# Patient Record
Sex: FEMALE | Race: WHITE | NOT HISPANIC OR LATINO | Employment: FULL TIME | URBAN - METROPOLITAN AREA
[De-identification: names, ages, dates, MRNs, and addresses within clinical notes are randomized per-mention and may not be internally consistent; named-entity substitution may affect disease eponyms.]

---

## 2017-01-17 ENCOUNTER — ALLSCRIPTS OFFICE VISIT (OUTPATIENT)
Dept: OTHER | Facility: OTHER | Age: 47
End: 2017-01-17

## 2017-03-13 ENCOUNTER — GENERIC CONVERSION - ENCOUNTER (OUTPATIENT)
Dept: OTHER | Facility: OTHER | Age: 47
End: 2017-03-13

## 2018-01-11 NOTE — MISCELLANEOUS
Message  Return to work or school:   Robinson Lewis is under my professional care  She was seen in my office on 11/10/2016   She is able to return to work on  12/09/2016    Please allow her to be off from work December 6-8 due to surgery  SHe may return on 12/9/2016 without restrictions per Dr Oakes [facility]  Dr Komal Rodriguez   Electronically signed by :  David Kelsey, ; Dec  7 2016 12:47PM EST                       (Author)

## 2018-01-12 NOTE — MISCELLANEOUS
Provider Comments  Provider Comments:   PT NO SHOW 03/13/2017        Signatures   Electronically signed by :  [address] [place] [address], ; Mar 13 2017  4:57PM EST                       (Author)

## 2018-01-13 VITALS
DIASTOLIC BLOOD PRESSURE: 70 MMHG | WEIGHT: 177.38 LBS | SYSTOLIC BLOOD PRESSURE: 120 MMHG | HEIGHT: 63 IN | BODY MASS INDEX: 31.43 KG/M2

## 2018-01-13 NOTE — MISCELLANEOUS
Provider Comments  Provider Comments:   PATIENT NO SHOWED FOR APPOINTMENT TODAY,CALLED AND LEFT MESSAGE ON PATIENTS VOICE MAIL TO CALL THE OFFICE      Signatures   Electronically signed by : Jayden Duffy, ; Nov 7 2016  9:55AM EST                       (Author)

## 2018-01-13 NOTE — MISCELLANEOUS
Message  Return to work or school:   Devan Luis is under my professional care  She was seen in my office on 09/30/2016       PLEASE ALLOW DAYANA TO USE RESTROOM AS NEEDED DUE TO HEAVY BLEEDING  Dr C W Riedel/placido        Signatures   Electronically signed by : Shannan Crook, ; Sep 30 2016 12:53PM EST                       (Author)

## 2018-01-16 NOTE — MISCELLANEOUS
Message  DAVID VERONICA FROM [facility] CALLED OFFICE STATING SHE HAS BEEN TRYING TO CALL PATIENT SINCE 10/10/2016 TO GET HER IN FOR PRE ADMISSION TESTING, PATIENT NEVER CALLED BACK, I TRIED CALLING PATIENT LEFT MESSAGE ON PATIENTS PHONE TO CALL OFFICE, PATIENT IS SCHEDULED FOR SURGERY ON 10/21/16      Active Problems    1  Abnormal uterine bleeding (AUB) (626 9) (N93 9)   2  Asthma (493 90) (J45 909)   3  Encounter for cervical Pap smear with pelvic exam (V76 2,V72 31) (Z01 419)   4  Encounter for mammogram to establish baseline mammogram (V76 12) (Z12 31)   5  Metrorrhagia (626 6) (N92 1)   6  Pap smear abnormality of cervix with LGSIL (795 03) (R87 612)   7  Pelvic pain in female (625 9) (R10 2)    Current Meds   1  Ibuprofen TABS; Therapy: (Recorded:67Ody1715) to Recorded   2  Proventil  (90 Base) MCG/ACT Inhalation Aerosol Solution; 2 puffs 4 times per   day as needed; Therapy: 96Osk7713 to (Last Rx:06Tho1875)  Requested for: 34Ogv0667 Ordered    Allergies    1   Codeine Derivatives    Signatures   Electronically signed by : Gabriella Tellez, ; Oct 20 2016 10:41AM EST                       (Author)

## 2018-01-16 NOTE — MISCELLANEOUS
Message  PATIENT HAD SURGERY ON 12/06/16, PATIENT CALLED STATING SHE WORK UP THIS MORNING AND COUGH, PATIENT STATED SHE BEGAN TO BLEED HEAVY WITH BLOOD CLOTS, PATIENT STATED SHE WENT THROUGH 8-9 PADS SINCE 11:00 AM THIS MORNING, PHONE CALL WAS TRANSFERRED TO DR RIEDEL      Active Problems    1  Abnormal uterine bleeding (AUB) (626 9) (N93 9)   2  Asthma (493 90) (J45 909)   3  Encounter for cervical Pap smear with pelvic exam (V76 2,V72 31) (Z01 419)   4  Encounter for mammogram to establish baseline mammogram (V76 12) (Z12 31)   5  Metrorrhagia (626 6) (N92 1)   6  Pap smear abnormality of cervix with LGSIL (795 03) (R87 612)   7  Pelvic pain in female (625 9) (R10 2)    Current Meds   1  Ibuprofen TABS; Therapy: (Recorded:21Iwb2058) to Recorded   2  Proventil  (90 Base) MCG/ACT Inhalation Aerosol Solution; 2 puffs 4 times per   day as needed; Therapy: 90Gbk0428 to (Last Rx:17Mmb8038)  Requested for: 47Rdu9162 Ordered    Allergies    1   Codeine Derivatives    Signatures   Electronically signed by : Adele Shen, ; Dec 15 2016  3:54PM EST                       (Author)

## 2018-01-24 NOTE — PROCEDURES
Active Problems    1  Abnormal uterine bleeding (AUB) (626 9) (N93 9)   2  Asthma (493 90) (J45 909)   3  Encounter for cervical Pap smear with pelvic exam (V76 2,V72 31) (Z01 419)   4  Encounter for mammogram to establish baseline mammogram (V76 12) (Z12 31)   5  Metrorrhagia (626 6) (N92 1)   6  Pelvic pain in female (625 9) (R10 2)    Current Meds    1  Proventil  (90 Base) MCG/ACT Inhalation Aerosol Solution; 2 puffs 4 times per   day as needed; Therapy: 79Gll3489 to (Last Rx:11Aci2641)  Requested for: 72Eek3309 Ordered    2  Ibuprofen TABS; Therapy: (Recorded:70Zgt0117) to Recorded    Allergies    1  Codeine Derivatives    Procedure    Procedure: colposcopy  Indication: low grade squamous intraepithelial lesion  Risks, benefits and alternatives were discussed with the patient  We discussed possible complications, including infection, bleeding and allergic reaction  Written consent was obtained prior to the procedure  The patient was premedicated with ibuprofen  Procedure Note:   A cervical Pap smear was not performed  The squamocolumnar junction was not fully visualized  Observation without staining showed no abnormalities  After bathing the cervix in acetic acid, evaluation showed acetowhite changes at, but no punctation, no mosaicism and no atypical vessels  Cervical Biopsy: biopsies taken of the cervix  the biopsies were taken at 3-4-8-12 o'clock  Hemostasis was obtained with Monsel's solution  Patient Status: the patient tolerated the procedure well  Complications: there were no complications          Signatures   Electronically signed by : Cheryl Bethea DO; Sep 30 2016  9:05AM EST                       (Author)

## 2018-05-14 ENCOUNTER — OFFICE VISIT (OUTPATIENT)
Dept: INTERNAL MEDICINE CLINIC | Age: 48
End: 2018-05-14
Payer: COMMERCIAL

## 2018-05-14 VITALS
BODY MASS INDEX: 32.11 KG/M2 | SYSTOLIC BLOOD PRESSURE: 136 MMHG | DIASTOLIC BLOOD PRESSURE: 78 MMHG | TEMPERATURE: 98.4 F | WEIGHT: 181.2 LBS | HEIGHT: 63 IN | HEART RATE: 66 BPM | OXYGEN SATURATION: 96 %

## 2018-05-14 DIAGNOSIS — E66.9 OBESITY (BMI 30.0-34.9): ICD-10-CM

## 2018-05-14 DIAGNOSIS — M25.562 CHRONIC PAIN OF LEFT KNEE: Primary | ICD-10-CM

## 2018-05-14 DIAGNOSIS — J45.20 MILD INTERMITTENT ASTHMA WITHOUT COMPLICATION: ICD-10-CM

## 2018-05-14 DIAGNOSIS — Z72.0 TOBACCO USER: ICD-10-CM

## 2018-05-14 DIAGNOSIS — G89.29 CHRONIC PAIN OF LEFT KNEE: Primary | ICD-10-CM

## 2018-05-14 PROBLEM — M65.30 ACQUIRED TRIGGER FINGER: Status: ACTIVE | Noted: 2018-05-14

## 2018-05-14 PROCEDURE — 99203 OFFICE O/P NEW LOW 30 MIN: CPT | Performed by: INTERNAL MEDICINE

## 2018-05-14 PROCEDURE — 3008F BODY MASS INDEX DOCD: CPT | Performed by: INTERNAL MEDICINE

## 2018-05-14 RX ORDER — ALBUTEROL SULFATE 90 UG/1
2 AEROSOL, METERED RESPIRATORY (INHALATION) EVERY 6 HOURS PRN
Qty: 1 INHALER | Refills: 1 | Status: SHIPPED | OUTPATIENT
Start: 2018-05-14 | End: 2019-10-10

## 2018-05-14 RX ORDER — ALBUTEROL SULFATE 90 UG/1
2 AEROSOL, METERED RESPIRATORY (INHALATION) EVERY 6 HOURS PRN
Qty: 1 INHALER | Refills: 1 | Status: SHIPPED | OUTPATIENT
Start: 2018-05-14

## 2018-05-14 RX ORDER — ALBUTEROL SULFATE 90 UG/1
2 AEROSOL, METERED RESPIRATORY (INHALATION) 4 TIMES DAILY PRN
COMMUNITY
Start: 2013-09-03 | End: 2018-05-14 | Stop reason: SDUPTHER

## 2018-05-14 NOTE — PATIENT INSTRUCTIONS
Knee Exercises   AMBULATORY CARE:   What you need to know about knee exercises:  Knee exercises help strengthen the muscles around your knee  Strong muscles can help reduce pain and decrease your risk of future injury  Knee exercises also help you heal after an injury or surgery  · Start slow  These are beginning exercises  Ask your healthcare provider if you need to see a physical therapist for more advanced exercises  As you get stronger, you may be able to do more sets of each exercise or add weights  · Stop if you feel pain  It is normal to feel some discomfort at first  Regular exercise will help decrease your discomfort over time  · Do the exercises on both legs  Do this so both knees remain strong  · Warm up before you do knee exercises  Walk or ride a stationary bike for 5 or 10 minutes to warm your muscles  How to perform knee stretches safely:  Always stretch before you do strengthening exercises  Do these stretching exercises again after you do the strengthening exercises  Do these stretches 4 or 5 days a week, or as directed  · Standing calf stretch: Face a wall and place both palms flat on the wall, or hold the back of a chair for balance  Keep a slight bend in your knees  Take a big step backward with one leg  Keep your other leg directly under you  Keep both heels flat and press your hips forward  Hold the stretch for 30 seconds, and then relax for 30 seconds  Switch legs  Repeat 2 or 3 times on each leg  · Standing quadriceps stretch:  Stand and place one hand against a wall or hold the back of a chair for balance  With your weight on one leg, bend your other leg and grab your ankle  Bring your heel toward your buttocks  Hold the stretch for 30 to 60 seconds  Switch legs  Repeat 2 or 3 times on each leg  · Sitting hamstring stretch:  Sit with both legs straight in front of you  Do not point or flex your toes   Place your palms on the floor and slide your hands forward until you feel the stretch  Do not round your back  Hold the stretch for 30 seconds  Repeat 2 or 3 times  How to perform knee strengthening exercises safely:  Do these exercises 4 or 5 days a week, or as directed  · Standing half squats:  Stand with your feet shoulder-width apart  Lean your back against a wall or hold the back of a chair for balance, if needed  Slowly sit down about 10 inches, as if you are going to sit in a chair  Your body weight should be mostly over your heels  Hold the squat for 5 seconds, then rise to a standing position  Do 3 sets of 10 squats to strengthen your buttocks and thighs  · Standing hamstring curls: Face a wall and place both palms flat on the wall, or hold the back of a chair for balance  With your weight on one leg, lift your other foot as close to your buttocks as you can  Hold for 5 seconds and then lower your leg  Do 2 sets of 10 curls on each leg  This exercise strengthens the muscles in the back of your thigh  · Standing calf raises:  Face a wall and place both palms flat on the wall, or hold the back of a chair for balance  Stand up straight, and do not lean  Place all your weight on one leg by lifting the other foot off the floor  Raise the heel of the foot that is on the floor as high as you can and then lower it  Do 2 sets of 10 calf raises on each leg to strengthen your calf muscles  · Straight leg lifts:  Lie on your stomach with straight legs  Fold your arms in front of you and rest your head in your arms  Tighten your leg muscles and raise one leg as high as you can  Hold for 5 seconds, then lower your leg  Do 2 sets of 10 lifts on each leg to strengthen your buttocks  · Sitting leg lifts:  Sit in a chair  Slowly straighten and raise one leg  Squeeze your thigh muscles and hold for 5 seconds  Relax and return your foot to the floor  Do 2 sets of 10 lifts on each leg   This helps strengthen the muscles in the front of your thigh  Contact your healthcare provider if:   · You have new pain or your pain becomes worse  · You have questions or concerns about your condition or care  © 2017 [employer] Information is for End User's use only and may not be sold, redistributed or otherwise used for commercial purposes  All illustrations and images included in CareNotes® are the copyrighted property of A D [employer] M [employer]  or Zana Kelly  The above information is an  only  It is not intended as medical advice for individual conditions or treatments  Talk to your doctor, nurse or pharmacist before following any medical regimen to see if it is safe and effective for you  How to Stop Smoking   AMBULATORY CARE:   You will improve your health and the health of others around you  if you stop smoking  Your risk for heart and lung disease, cancer, stroke, heart attack, and vision problems will also decrease  You can benefit from quitting no matter how long you have smoked  Prepare to stop smoking:  Nicotine is a highly addictive drug found in cigarettes  Withdrawal symptoms can happen when you stop smoking and make it hard to quit  These include anxiety, depression, irritability, trouble sleeping, and increased appetite  You increase your chances of success if you prepare to quit  · Set a quit date  Veda Pry a date that is within the next 2 weeks  Do not pick a day that you think may be stressful or busy  Write down the day or [place] it on your calender  · Tell friends and family that you plan to quit  Explain that you may have withdrawal symptoms when you try to quit  Ask them to support you  They may be able to encourage you and help reduce your stress to make it easier for you to quit  · Make a list of your reasons for quitting  Put the list somewhere you will see it every day, such as your refrigerator  You can look at the list when you have a craving       · Remove all tobacco and nicotine products from your home, car, and workplace  Also, remove anything else that will tempt you to smoke, such as lighters, matches, or ashtrays  Clean your car, home, and places at work that smell like smoke  The smell of smoke can trigger a craving  · Identify triggers that make you want to smoke  This may include activities, feelings, or people  Also write down 1 way you can deal with each of your triggers  For example, if you want to smoke as soon as you wake up, plan another activity during this time, such as exercise  · Make a plan for how you will quit  Learn about the tools that can help you quit, such as medicine, counseling, or nicotine replacement therapy  Choose at least 2 options to help you quit  Tools to help you stop smoking:   · Counseling  from a trained healthcare provider can provide you with support and skills to quit smoking  The provider will also teach you to manage your withdrawal symptoms and cravings  You may receive counseling from one counselor, in group therapy, or through phone therapy called a quit line  · Nicotine replacement therapy (NRT)  such as nicotine patches, gum, or lozenges may help reduce your nicotine cravings  You may get these without a doctor's order  Do not use e-cigarettes or smokeless tobacco in place of cigarettes or to help you quit  They still contain nicotine  · Prescription medicines  such as nasal sprays or nicotine inhalers may help reduce your withdrawal symptoms  Other medicines may also be used to reduce your urge to smoke  Ask your healthcare provider about these medicines  You may need to start certain medicines 2 weeks before your quit date for them to work well  · Hypnosis  is a practice that helps guide you through thoughts and feelings  Hypnosis may help decrease your cravings and make you more willing to quit  · Acupuncture therapy  uses very thin needles to balance energy channels in the body   This is thought to help decrease cravings and symptoms of nicotine withdrawal      · Support groups  let you talk to others who are trying to quit or have already quit  It may be helpful to speak with others about how they quit  Manage your cravings:   · Avoid situations, people, and places that tempt you to smoke  Go to nonsmoking places, such as libraries or restaurants  Understand what tempts you and try to avoid these things  · Keep your hands busy  Hold things such as a stress ball or pen  · Put candy or toothpicks in your mouth  Keep lollipops, sugarless gum, or toothpicks with you at all times  · Do not have alcohol or caffeine  These drinks may tempt you to smoke  Drink healthy liquids such as water or juice instead  · Reward yourself when you resist your cravings  Rewards will motivate you and help you stay positive  · Do an activity that distracts you from your craving  Examples include going for a walk, exercising, or cleaning  Prevent weight gain after you quit:  You may gain a few pounds after you quit smoking  It is healthier for you to gain a few pounds than to continue to smoke  The following can help you prevent weight gain:  · Eat healthy foods  These include fruits, vegetables, whole-grain breads, low-fat dairy products, beans, lean meats, and fish  Eat healthy snacks, such as low-fat yogurt, if you get hungry between meals  · Drink water before, during, and between meals  This will make your stomach feel full and help prevent you from overeating  Ask your healthcare provider how much liquid to drink each day and which liquids are best for you  · Exercise  Take a walk or do some kind of exercise every day  Ask your healthcare provider what exercise is right for you  This may help reduce your cravings and reduce stress  For more support and information:   · Smokefree  gov  Phone: 6- 353 - 041-0509  Web Address: www smokefree  gov  © 2017 [employer] Information is for End User's use only and may not be sold, redistributed or otherwise used for commercial purposes  All illustrations and images included in CareNotes® are the copyrighted property of A [employer] Inc  or Zana Kelly  The above information is an  only  It is not intended as medical advice for individual conditions or treatments  Talk to your doctor, nurse or pharmacist before following any medical regimen to see if it is safe and effective for you

## 2018-05-14 NOTE — ASSESSMENT & PLAN NOTE
Patient has gained approximately 27 pounds in the last few months after by in a car    She does not walk anywhere anymore

## 2018-05-14 NOTE — ASSESSMENT & PLAN NOTE
Over the last several months patient's up increasing left knee pain with occasional locking  There has been no trauma    Will try OTC Motrin, ice and knee brace

## 2018-05-14 NOTE — PROGRESS NOTES
Assessment/Plan:    Chronic pain of left knee  Over the last several months patient's up increasing left knee pain with occasional locking  There has been no trauma  Will try OTC Motrin, ice and knee brace    Asthma  She has mild intermittent asthma for which she uses rare inhaler  Hers is outdated    Tobacco user  Patient currently smokes and does not want any medicine at present to quit    Obesity (BMI 30 0-34  9)  Patient has gained approximately 27 pounds in the last few months after by in a car  She does not walk anywhere anymore       Diagnoses and all orders for this visit:    Chronic pain of left knee    Tobacco user    Mild intermittent asthma without complication  -     albuterol (PROVENTIL HFA) 90 mcg/act inhaler; Inhale 2 puffs every 6 (six) hours as needed for shortness of breath  -     CBC and differential; Future  -     albuterol (PROVENTIL HFA,VENTOLIN HFA) 90 mcg/act inhaler; Inhale 2 puffs every 6 (six) hours as needed for wheezing    Obesity (BMI 30 0-34 9)  -     Lipid panel; Future  -     Comprehensive metabolic panel; Future  -     TSH baseline; Future    Other orders  -     Omeprazole (PRILOSEC PO); omeprazole  -     Discontinue: albuterol (PROVENTIL HFA) 90 mcg/act inhaler; Inhale 2 puffs 4 (four) times a day as needed          Subjective:      Patient ID: Hever Buenrostro is a 57 y o  female  Patient is changing PCPs  She was last seen 2 years ago when she had a LEEP  Her new complaints are increasing weight and lower left knee pain      Knee Pain    The incident occurred more than 1 week ago  There was no injury mechanism  The pain is present in the left knee  The quality of the pain is described as aching  The pain is at a severity of 4/10  The pain is mild  The pain has been intermittent since onset  The symptoms are aggravated by weight bearing and movement  She has tried acetaminophen and NSAIDs for the symptoms  The treatment provided mild relief     Nicotine Dependence   Presents for initial visit  Symptoms include cravings  Preferred tobacco types include cigarettes  Her urge triggers include company of smokers  (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  Past treatments include nothing  Hazeline Parent is thinking about quitting  There is no history of alcohol abuse and drug use  Review of Systems   Constitutional: Positive for activity change and unexpected weight change  HENT: Negative  Eyes: Negative  Respiratory: Negative  Cardiovascular: Negative  Gastrointestinal: Negative  Endocrine: Negative  Genitourinary: Negative  Musculoskeletal: Positive for joint swelling  Allergic/Immunologic: Negative  Neurological: Negative  Light-headedness: left knee pain  Hematological: Negative  Psychiatric/Behavioral: Negative  Objective:      /78 (BP Location: Left arm, Patient Position: Sitting, Cuff Size: Standard)   Pulse 66   Temp 98 4 °F (36 9 °C) (Tympanic)   Ht 5' 2 5" (1 588 m)   Wt 82 2 kg (181 lb 3 2 oz)   SpO2 96%   BMI 32 61 kg/m²          Physical Exam   Constitutional: She is oriented to person, place, and time  She appears well-developed and well-nourished  No distress  Obese   HENT:   Right Ear: External ear normal    Left Ear: External ear normal    Nose: Nose normal    Mouth/Throat: Oropharynx is clear and moist  No oropharyngeal exudate  Eyes: EOM are normal  Pupils are equal, round, and reactive to light  Neck: Normal range of motion  Neck supple  No JVD present  No thyromegaly present  Cardiovascular: Normal rate, regular rhythm, normal heart sounds and intact distal pulses  Exam reveals no gallop  No murmur heard  Pulmonary/Chest: Effort normal and breath sounds normal  No respiratory distress  She has no wheezes  She has no rales  Abdominal: Soft  Bowel sounds are normal  She exhibits no distension and no mass  There is no tenderness  Musculoskeletal: Normal range of motion   She exhibits no tenderness  Mild crepitation of left knee without effusion or instability   Lymphadenopathy:     She has no cervical adenopathy  Neurological: She is alert and oriented to person, place, and time  No cranial nerve deficit  Coordination normal    Skin: No rash noted  Psychiatric: She has a normal mood and affect  Her behavior is normal  Judgment and thought content normal    Vitals reviewed

## 2019-10-10 ENCOUNTER — OFFICE VISIT (OUTPATIENT)
Dept: INTERNAL MEDICINE CLINIC | Age: 49
End: 2019-10-10
Payer: COMMERCIAL

## 2019-10-10 VITALS
TEMPERATURE: 98 F | HEIGHT: 63 IN | DIASTOLIC BLOOD PRESSURE: 80 MMHG | SYSTOLIC BLOOD PRESSURE: 138 MMHG | WEIGHT: 186.2 LBS | BODY MASS INDEX: 32.99 KG/M2 | OXYGEN SATURATION: 98 % | HEART RATE: 72 BPM

## 2019-10-10 DIAGNOSIS — M25.562 CHRONIC PAIN OF BOTH KNEES: Primary | ICD-10-CM

## 2019-10-10 DIAGNOSIS — K21.9 GASTROESOPHAGEAL REFLUX DISEASE, ESOPHAGITIS PRESENCE NOT SPECIFIED: ICD-10-CM

## 2019-10-10 DIAGNOSIS — Z72.0 TOBACCO USER: ICD-10-CM

## 2019-10-10 DIAGNOSIS — Z12.31 SCREENING MAMMOGRAM, ENCOUNTER FOR: ICD-10-CM

## 2019-10-10 DIAGNOSIS — G89.29 CHRONIC PAIN OF BOTH KNEES: Primary | ICD-10-CM

## 2019-10-10 DIAGNOSIS — M25.561 CHRONIC PAIN OF BOTH KNEES: Primary | ICD-10-CM

## 2019-10-10 PROCEDURE — 99406 BEHAV CHNG SMOKING 3-10 MIN: CPT | Performed by: INTERNAL MEDICINE

## 2019-10-10 PROCEDURE — 99214 OFFICE O/P EST MOD 30 MIN: CPT | Performed by: INTERNAL MEDICINE

## 2019-10-10 PROCEDURE — 3008F BODY MASS INDEX DOCD: CPT | Performed by: INTERNAL MEDICINE

## 2019-10-10 RX ORDER — OMEPRAZOLE 20 MG/1
20 CAPSULE, DELAYED RELEASE ORAL DAILY
Qty: 30 CAPSULE | Refills: 5 | Status: SHIPPED | OUTPATIENT
Start: 2019-10-10 | End: 2020-12-18 | Stop reason: SDUPTHER

## 2019-10-10 NOTE — PATIENT INSTRUCTIONS
Obesity   AMBULATORY CARE:   Obesity  is when your body mass index (BMI) is greater than 30  Your healthcare provider will use your height and weight to measure your BMI  The risks of obesity include  many health problems, such as injuries or physical disability  You may need tests to check for the following:  · Diabetes     · High blood pressure or high cholesterol     · Heart disease     · Gallbladder or liver disease     · Cancer of the colon, breast, prostate, liver, or kidney     · Sleep apnea     · Arthritis or gout  Seek care immediately if:   · You have a severe headache, confusion, or difficulty speaking  · You have weakness on one side of your body  · You have chest pain, sweating, or shortness of breath  Contact your healthcare provider if:   · You have symptoms of gallbladder or liver disease, such as pain in your upper abdomen  · You have knee or hip pain and discomfort while walking  · You have symptoms of diabetes, such as intense hunger and thirst, and frequent urination  · You have symptoms of sleep apnea, such as snoring or daytime sleepiness  · You have questions or concerns about your condition or care  Treatment for obesity  focuses on helping you lose weight to improve your health  Even a small decrease in BMI can reduce the risk for many health problems  Your healthcare provider will help you set a weight-loss goal   · Lifestyle changes  are the first step in treating obesity  These include making healthy food choices and getting regular physical activity  Your healthcare provider may suggest a weight-loss program that involves coaching, education, and therapy  · Medicine  may help you lose weight when it is used with a healthy diet and physical activity  · Surgery  can help you lose weight if you are very obese and have other health problems  There are several types of weight-loss surgery  Ask your healthcare provider for more information    Be successful losing weight:   · Set small, realistic goals  An example of a small goal is to walk for 20 minutes 5 days a week  Anther goal is to lose 5% of your body weight  · Tell friends, family members, and coworkers about your goals  and ask for their support  Ask a friend to lose weight with you, or join a weight-loss support group  · Identify foods or triggers that may cause you to overeat , and find ways to avoid them  Remove tempting high-calorie foods from your home and workplace  Place a bowl of fresh fruit on your kitchen counter  If stress causes you to eat, then find other ways to cope with stress  · Keep a diary to track what you eat and drink  Also write down how many minutes of physical activity you do each day  Weigh yourself once a week and record it in your diary  Eating changes: You will need to eat 500 to 1,000 fewer calories each day than you currently eat to lose 1 to 2 pounds a week  The following changes will help you cut calories:  · Eat smaller portions  Use small plates, no larger than 9 inches in diameter  Fill your plate half full of fruits and vegetables  Measure your food using measuring cups until you know what a serving size looks like  · Eat 3 meals and 1 or 2 snacks each day  Plan your meals in advance  Chastity Aiken and eat at home most of the time  Eat slowly  · Eat fruits and vegetables at every meal   They are low in calories and high in fiber, which makes you feel full  Do not add butter, margarine, or cream sauce to vegetables  Use herbs to season steamed vegetables  · Eat less fat and fewer fried foods  Eat more baked or grilled chicken and fish  These protein sources are lower in calories and fat than red meat  Limit fast food  Dress your salads with olive oil and vinegar instead of bottled dressing  · Limit the amount of sugar you eat  Do not drink sugary beverages  Limit alcohol  Activity changes:  Physical activity is good for your body in many ways   It helps you burn calories and build strong muscles  It decreases stress and depression, and improves your mood  It can also help you sleep better  Talk to your healthcare provider before you begin an exercise program   · Exercise for at least 30 minutes 5 days a week  Start slowly  Set aside time each day for physical activity that you enjoy and that is convenient for you  It is best to do both weight training and an activity that increases your heart rate, such as walking, bicycling, or swimming  · Find ways to be more active  Do yard work and housecleaning  Walk up the stairs instead of using elevators  Spend your leisure time going to events that require walking, such as outdoor festivals or fairs  This extra physical activity can help you lose weight and keep it off  Follow up with your healthcare provider as directed: You may need to meet with a dietitian  Write down your questions so you remember to ask them during your visits  © 2017 [address] Information is for End User's use only and may not be sold, redistributed or otherwise used for commercial purposes  All illustrations and images included in CareNotes® are the copyrighted property of [employer]  or Zana Kelly  The above information is an  only  It is not intended as medical advice for individual conditions or treatments  Talk to your doctor, nurse or pharmacist before following any medical regimen to see if it is safe and effective for you  Heart Healthy Diet   AMBULATORY CARE:   A heart healthy diet  is an eating plan low in total fat, unhealthy fats, and sodium (salt)  A heart healthy diet helps decrease your risk for heart disease and stroke  Limit the amount of fat you eat to 25% to 35% of your total daily calories  Limit sodium to less than 2,300 mg each day  Healthy fats:  Healthy fats can help improve cholesterol levels   The risk for heart disease is decreased when cholesterol levels are normal  Choose healthy fats, such as the following:  · Unsaturated fat  is found in foods such as soybean, canola, olive, corn, and safflower oils  It is also found in soft tub margarine that is made with liquid vegetable oil  · Omega-3 fat  is found in certain fish, such as salmon, tuna, and trout, and in walnuts and flaxseed  Unhealthy fats:  Unhealthy fats can cause unhealthy cholesterol levels in your blood and increase your risk of heart disease  Limit unhealthy fats, such as the following:  · Cholesterol  is found in animal foods, such as eggs and lobster, and in dairy products made from whole milk  Limit cholesterol to less than 300 milligrams (mg) each day  You may need to limit cholesterol to 200 mg each day if you have heart disease  · Saturated fat  is found in meats, such as moya and hamburger  It is also found in chicken or turkey skin, whole milk, and butter  Limit saturated fat to less than 7% of your total daily calories  Limit saturated fat to less than 6% if you have heart disease or are at increased risk for it  · Trans fat  is found in packaged foods, such as potato chips and cookies  It is also in hard margarine, some fried foods, and shortening  Avoid trans fats as much as possible    Heart healthy foods and drinks to include:  Ask your dietitian or healthcare provider how many servings to have from each of the following food groups:  · Grains:      ¨ Whole-wheat breads, cereals, and pastas, and brown rice    ¨ Low-fat, low-sodium crackers and chips    · Vegetables:      ¨ Broccoli, green beans, green peas, and spinach    ¨ Collards, kale, and lima beans    ¨ Carrots, sweet potatoes, tomatoes, and peppers    ¨ Canned vegetables with no salt added    · Fruits:      ¨ Bananas, peaches, pears, and pineapple    ¨ Grapes, raisins, and dates    ¨ Oranges, tangerines, grapefruit, orange juice, and grapefruit juice    ¨ Apricots, mangoes, melons, and papaya    ¨ Raspberries and strawberries    ¨ Canned fruit with no added sugar    · Low-fat dairy products:      ¨ Nonfat (skim) milk, 1% milk, and low-fat almond, cashew, or soy milks fortified with calcium    ¨ Low-fat cheese, regular or frozen yogurt, and cottage cheese    · Meats and proteins , such as lean cuts of beef and pork (loin, leg, round), skinless chicken and turkey, legumes, soy products, egg whites, and nuts  Foods and drinks to limit or avoid:  Ask your dietitian or healthcare provider about these and other foods that are high in unhealthy fat, sodium, and sugar:  · Snack or packaged foods , such as frozen dinners, cookies, macaroni and cheese, and cereals with more than 300 mg of sodium per serving    · Canned or dry mixes  for cakes, soups, sauces, or gravies    · Vegetables with added sodium , such as instant potatoes, vegetables with added sauces, or regular canned vegetables    · Other foods high in sodium , such as ketchup, barbecue sauce, salad dressing, pickles, olives, soy sauce, and miso    · High-fat dairy foods  such as whole or 2% milk, cream cheese, or sour cream, and cheeses     · High-fat protein foods  such as high-fat cuts of beef (T-bone steaks, ribs), chicken or turkey with skin, and organ meats, such as liver    · Cured or smoked meats , such as hot dogs, moya, and sausage    · Unhealthy fats and oils , such as butter, stick margarine, shortening, and cooking oils such as coconut or palm oil    · Food and drinks high in sugar , such as soft drinks (soda), sports drinks, sweetened tea, candy, cake, cookies, pies, and doughnuts  Other diet guidelines to follow:   · Eat more foods containing omega-3 fats  Eat fish high in omega-3 fats at least 2 times a week  · Limit alcohol  Too much alcohol can damage your heart and raise your blood pressure  Women should limit alcohol to 1 drink a day  Men should limit alcohol to 2 drinks a day   A drink of alcohol is 12 ounces of beer, 5 ounces of wine, or 1½ ounces of liquor  · Choose low-sodium foods  High-sodium foods can lead to high blood pressure  Add little or no salt to food you prepare  Use herbs and spices in place of salt  · Eat more fiber  to help lower cholesterol levels  Eat at least 5 servings of fruits and vegetables each day  Eat 3 ounces of whole-grain foods each day  Legumes (beans) are also a good source of fiber  Lifestyle guidelines:   · Do not smoke  Nicotine and other chemicals in cigarettes and cigars can cause lung and heart damage  Ask your healthcare provider for information if you currently smoke and need help to quit  E-cigarettes or smokeless tobacco still contain nicotine  Talk to your healthcare provider before you use these products  · Exercise regularly  to help you maintain a healthy weight and improve your blood pressure and cholesterol levels  Ask your healthcare provider about the best exercise plan for you  Do not start an exercise program without asking your healthcare provider  Follow up with your healthcare provider as directed:  Write down your questions so you remember to ask them during your visits  © 2017 [facility] [facility] Information is for End User's use only and may not be sold, redistributed or otherwise used for commercial purposes  All illustrations and images included in CareNotes® are the copyrighted property of [employer]  or Zana Kelly  The above information is an  only  It is not intended as medical advice for individual conditions or treatments  Talk to your doctor, nurse or pharmacist before following any medical regimen to see if it is safe and effective for you  Calorie Counting Diet   WHAT YOU NEED TO KNOW:   What is a calorie counting diet? It is a meal plan based on counting calories each day to reach a healthy body weight  You will need to eat fewer calories if you are trying to lose weight   Weight loss may decrease your risk for certain health problems or improve your health if you have health problems  Some of these health problems include heart disease, high blood pressure, and diabetes  What foods should I avoid? Your dietitian will tell you if you need to avoid certain foods based on your body weight and health condition  You may need to avoid high-fat foods if you are at risk for or have heart disease  You may need to eat fewer foods from the breads and starches food group if you have diabetes  How many calories are in foods? The following is a list of foods and drinks with the approximate number of calories in each  Check the food label to find the exact number of calories  A dietitian can tell you how many calories you should have from each food group each day    · Carbohydrate:      ¨ ½ of a 3-inch bagel, 1 slice of bread, or ½ of a hamburger bun or hot dog bun (80)    ¨ 1 (8-inch) flour tortilla or ½ cup of cooked rice (100)    ¨ 1 (6-inch) corn tortilla (80)    ¨ 1 (6-inch) pancake or 1 cup of bran flakes cereal (110)    ¨ ½ cup of cooked cereal (80)    ¨ ½ cup of cooked pasta (85)    ¨ 1 ounce of pretzels (100)    ¨ 3 cups of air-popped popcorn without butter or oil (80)    · Dairy:      ¨ 1 cup of skim or 1% milk (90)    ¨ 1 cup of 2% milk (120)    ¨ 1 cup of whole milk (160)    ¨ 1 cup of 2% chocolate milk (220)    ¨ 1 ounce of low-fat cheese with 3 grams of fat per ounce (70)    ¨ 1 ounce of cheddar cheese (114)    ¨ ½ cup of 1% fat cottage cheese (80)    ¨ 1 cup of plain or sugar-free, fat-free yogurt (90)    · Protein foods:      ¨ 3 ounces of fish (not breaded or fried) (95)    ¨ 3 ounces of breaded, fried fish (195)    ¨ ¾ cup of tuna canned in water (105)    ¨ 3 ounces of chicken breast without skin (105)    ¨ 1 fried chicken breast with skin (350)    ¨ ¼ cup of fat free egg substitute (40)    ¨ 1 large egg (75)    ¨ 3 ounces of lean beef or pork (165)    ¨ 3 ounces of fried pork chop or ham (185)    ¨ ½ cup of cooked dried beans, such as kidney, webb, lentils, or navy (115)    ¨ 3 ounces of bologna or lunch meat (225)    ¨ 2 links of breakfast sausage (140)    · Vegetables:      ¨ ½ cup of sliced mushrooms (10)    ¨ 1 cup of salad greens, such as lettuce, spinach, or rosemarie (15)    ¨ ½ cup of steamed asparagus (20)    ¨ ½ cup of cooked summer squash, zucchini squash, or green or wax beans (25)    ¨ 1 cup of broccoli or cauliflower florets, or 1 medium tomato (25)    ¨ 1 large raw carrot or ½ cup of cooked carrots (40)    ¨ ? of a medium cucumber or 1 stalk of celery (5)    ¨ 1 small baked potato (160)    ¨ 1 cup of breaded, fried vegetables (230)    · Fruit:      ¨ 1 (6-inch) banana (55)     ¨ ½ of a 4-inch grapefruit (55)    ¨ 15 grapes (60)    ¨ 1 medium orange or apple (70)    ¨ 1 large peach (65)    ¨ 1 cup of fresh pineapple chunks (75)    ¨ 1 cup of melon cubes (50)    ¨ 1¼ cups of whole strawberries (45)    ¨ ½ cup of fruit canned in juice (55)    ¨ ½ cup of fruit canned in heavy syrup (110)    ¨ ?  cup of raisins (130)    ¨ ½ cup of unsweetened fruit juice (60)    ¨ ½ cup of grape, cranberry, or prune juice (90)    · Fat:      ¨ 10 peanuts or 2 teaspoons of peanut butter (55)    ¨ 2 tablespoons of avocado or 1 tablespoon of regular salad dressing (45)    ¨ 2 slices of moya (90)    ¨ 1 teaspoon of oil, such as safflower, canola, corn, or olive oil (45)    ¨ 2 teaspoons of low-fat margarine, or 1 tablespoon of low-fat mayonnaise (50)    ¨ 1 teaspoon of regular margarine (40)    ¨ 1 tablespoon of regular mayonnaise (135)    ¨ 1 tablespoon of cream cheese or 2 tablespoons of low-fat cream cheese (45)    ¨ 2 tablespoons of vegetable shortening (215)    · Dessert and sweets:      ¨ 8 animal crackers or 5 vanilla wafers (80)    ¨ 1 frozen fruit juice bar (80)    ¨ ½ cup of ice milk or low-fat frozen yogurt (90)    ¨ ½ cup of sherbet or sorbet (125)    ¨ ½ cup of sugar-free pudding or custard (60)    ¨ ½ cup of ice cream (140)    ¨ ½ cup of pudding or custard (175)    ¨ 1 (2-inch) square chocolate brownie (185)    · Combination foods:      ¨ Bean burrito made with an 8-inch tortilla, without cheese (275)    ¨ Chicken breast sandwich with lettuce and tomato (325)    ¨ 1 cup of chicken noodle soup (60)    ¨ 1 beef taco (175)    ¨ Regular hamburger with lettuce and tomato (310)    ¨ Regular cheeseburger with lettuce and tomato (410)     ¨ ¼ of a 12-inch cheese pizza (280)    ¨ Fried fish sandwich with lettuce and tomato (425)    ¨ Hot dog and bun (275)    ¨ 1½ cups of macaroni and cheese (310)    ¨ Taco salad with a fried tortilla shell (870)    · Low-calorie foods:      ¨ 1 tablespoon of ketchup or 1 tablespoon of fat free sour cream (15)    ¨ 1 teaspoon of mustard (5)    ¨ ¼ cup of salsa (20)    ¨ 1 large dill pickle (15)    ¨ 1 tablespoon of fat free salad dressing (10)    ¨ 2 teaspoons of low-sugar, light jam or jelly, or 1 tablespoon of sugar-free syrup (15)    ¨ 1 sugar-free popsicle (15)    ¨ 1 cup of club soda, seltzer water, or diet soda (0)  CARE AGREEMENT:   You have the right to help plan your care  Discuss treatment options with your caregivers to decide what care you want to receive  You always have the right to refuse treatment  The above information is an  only  It is not intended as medical advice for individual conditions or treatments  Talk to your doctor, nurse or pharmacist before following any medical regimen to see if it is safe and effective for you  © 2017 [address] Information is for End User's use only and may not be sold, redistributed or otherwise used for commercial purposes  All illustrations and images included in CareNotes® are the copyrighted property of A [employer] M [employer]  or Zana Kelly

## 2019-10-10 NOTE — ASSESSMENT & PLAN NOTE
She continues to be a smoker and was counseled 3-10 minutes on the benefits of and ways to quit smoking

## 2019-10-10 NOTE — ASSESSMENT & PLAN NOTE
It also appears that the more Motrin she takes the more her stomach does not feel right    And told to take her omeprazole on a daily basis not once a week for same until the needs get straightened out

## 2019-10-10 NOTE — ASSESSMENT & PLAN NOTE
She also continues to remain obese is complaining of bilateral knee pain which is makes it difficult for her to exercise    She was again counseled on diet and exercise for same

## 2019-10-10 NOTE — PROGRESS NOTES
Assessment/Plan:    Tobacco user  She continues to be a smoker and was counseled 3-10 minutes on the benefits of and ways to quit smoking    Obesity (BMI 30 0-34  9)  She also continues to remain obese is complaining of bilateral knee pain which is makes it difficult for her to exercise  She was again counseled on diet and exercise for same    Gastroesophageal reflux disease  It also appears that the more Motrin she takes the more her stomach does not feel right  And told to take her omeprazole on a daily basis not once a week for same until the needs get straightened out    Chronic pain of both knees  Her big problem is chronic knee pain which she has been seen Dr Myriam Wilson for and having recurrent steroid shots in for relief  But she now is not seen as much relief and complains of increasing pain  He has talked about using Synvisc or similar  Will add physical therapy in hopes he will strengthen her quads for more pain relief       Diagnoses and all orders for this visit:    Chronic pain of both knees  -     Ambulatory referral to Physical Therapy; Future  -     Ambulatory referral to Orthopedic Surgery; Future    Gastroesophageal reflux disease, esophagitis presence not specified  -     omeprazole (PRILOSEC) 20 mg delayed release capsule; Take 1 capsule (20 mg total) by mouth daily    Tobacco user    Obesity (BMI 30 0-34  9)    Screening mammogram, encounter for  -     Mammo screening bilateral w cad; Future          Subjective:      Patient ID: Shweta Crain is a 54 y o  female  Knee Pain    Incident onset: Years  There was no injury mechanism  The pain is present in the left knee and right knee  The quality of the pain is described as aching  The pain is at a severity of 7/10  The pain is moderate  The pain has been worsening since onset  Associated symptoms include an inability to bear weight  Pertinent negatives include no numbness  The symptoms are aggravated by weight bearing and movement   She has tried acetaminophen, ice, rest and NSAIDs for the symptoms  The treatment provided mild relief  Review of Systems   Constitutional: Negative for chills, fatigue, fever and unexpected weight change  HENT: Negative for congestion, ear pain, hearing loss, postnasal drip, sinus pressure, sore throat, trouble swallowing and voice change  Eyes: Negative for visual disturbance  Respiratory: Negative for cough, chest tightness, shortness of breath and wheezing  Cardiovascular: Negative for chest pain, palpitations and leg swelling  Gastrointestinal: Negative for abdominal distention, abdominal pain, anal bleeding, blood in stool, constipation, diarrhea and nausea  Endocrine: Negative for cold intolerance, polydipsia, polyphagia and polyuria  Genitourinary: Negative for dysuria, flank pain, frequency, hematuria and urgency  Musculoskeletal: Negative for arthralgias, back pain, gait problem, joint swelling, myalgias and neck pain  Bilateral knee pain   Skin: Negative for rash  Allergic/Immunologic: Negative for immunocompromised state  Neurological: Negative for dizziness, syncope, facial asymmetry, weakness, light-headedness, numbness and headaches  Hematological: Negative for adenopathy  Psychiatric/Behavioral: Negative for confusion, sleep disturbance and suicidal ideas  f  Objective:      /80 (BP Location: Left arm, Patient Position: Sitting)   Pulse 72   Temp 98 °F (36 7 °C) (Tympanic)   Ht 5' 3" (1 6 m)   Wt 84 5 kg (186 lb 3 2 oz)   SpO2 98%   BMI 32 98 kg/m²          Physical Exam   Constitutional: She is oriented to person, place, and time  She appears well-developed and well-nourished  No distress  Obese   HENT:   Right Ear: External ear normal    Left Ear: External ear normal    Nose: Nose normal    Mouth/Throat: Oropharynx is clear and moist  No oropharyngeal exudate  Eyes: Pupils are equal, round, and reactive to light   EOM are normal    Neck: Normal range of motion  Neck supple  No JVD present  No thyromegaly present  Cardiovascular: Normal rate, regular rhythm, normal heart sounds and intact distal pulses  Exam reveals no gallop  No murmur heard  Pulmonary/Chest: Effort normal and breath sounds normal  No respiratory distress  She has no wheezes  She has no rales  Abdominal: Soft  Bowel sounds are normal  She exhibits no distension and no mass  There is no tenderness  Musculoskeletal: Normal range of motion  She exhibits no tenderness  Lymphadenopathy:     She has no cervical adenopathy  Neurological: She is alert and oriented to person, place, and time  No cranial nerve deficit  Coordination normal    Skin: No rash noted  Psychiatric: She has a normal mood and affect  Her behavior is normal  Judgment and thought content normal      BMI Counseling: Body mass index is 32 98 kg/m²  The BMI is above normal  Nutrition recommendations include reducing portion sizes

## 2019-10-10 NOTE — ASSESSMENT & PLAN NOTE
Her big problem is chronic knee pain which she has been seen Dr Naik Headings for and having recurrent steroid shots in for relief  But she now is not seen as much relief and complains of increasing pain  He has talked about using Synvisc or similar    Will add physical therapy in hopes he will strengthen her quads for more pain relief

## 2020-06-15 ENCOUNTER — HOSPITAL ENCOUNTER (EMERGENCY)
Facility: HOSPITAL | Age: 50
Discharge: HOME | End: 2020-06-15
Attending: EMERGENCY MEDICINE | Admitting: EMERGENCY MEDICINE
Payer: COMMERCIAL

## 2020-06-15 ENCOUNTER — APPOINTMENT (EMERGENCY)
Dept: CT IMAGING | Facility: HOSPITAL | Age: 50
End: 2020-06-15
Payer: COMMERCIAL

## 2020-06-15 VITALS
TEMPERATURE: 98.8 F | OXYGEN SATURATION: 98 % | WEIGHT: 187.39 LBS | DIASTOLIC BLOOD PRESSURE: 80 MMHG | RESPIRATION RATE: 16 BRPM | BODY MASS INDEX: 33.19 KG/M2 | HEART RATE: 70 BPM | SYSTOLIC BLOOD PRESSURE: 139 MMHG

## 2020-06-15 DIAGNOSIS — R10.9 RIGHT FLANK PAIN: Primary | ICD-10-CM

## 2020-06-15 DIAGNOSIS — M54.50 LOW BACK PAIN: ICD-10-CM

## 2020-06-15 LAB
ALBUMIN SERPL BCP-MCNC: 3.8 G/DL (ref 3.5–5)
ALP SERPL-CCNC: 84 U/L (ref 46–116)
ALT SERPL W P-5'-P-CCNC: 23 U/L (ref 12–78)
ANION GAP SERPL CALCULATED.3IONS-SCNC: 8 MMOL/L (ref 4–13)
AST SERPL W P-5'-P-CCNC: 12 U/L (ref 5–45)
BACTERIA UR QL AUTO: ABNORMAL /HPF
BASOPHILS # BLD AUTO: 0.09 THOUSANDS/ΜL (ref 0–0.1)
BASOPHILS NFR BLD AUTO: 1 % (ref 0–1)
BILIRUB SERPL-MCNC: 0.23 MG/DL (ref 0.2–1)
BILIRUB UR QL STRIP: NEGATIVE
BUN SERPL-MCNC: 18 MG/DL (ref 5–25)
CALCIUM SERPL-MCNC: 9.1 MG/DL (ref 8.3–10.1)
CHLORIDE SERPL-SCNC: 104 MMOL/L (ref 100–108)
CLARITY UR: CLEAR
CO2 SERPL-SCNC: 27 MMOL/L (ref 21–32)
COLOR UR: YELLOW
CREAT SERPL-MCNC: 1.22 MG/DL (ref 0.6–1.3)
EOSINOPHIL # BLD AUTO: 0.33 THOUSAND/ΜL (ref 0–0.61)
EOSINOPHIL NFR BLD AUTO: 3 % (ref 0–6)
ERYTHROCYTE [DISTWIDTH] IN BLOOD BY AUTOMATED COUNT: 13.4 % (ref 11.6–15.1)
EXT PREG TEST URINE: NEGATIVE
EXT. CONTROL ED NAV: NORMAL
GFR SERPL CREATININE-BSD FRML MDRD: 53 ML/MIN/1.73SQ M
GLUCOSE SERPL-MCNC: 122 MG/DL (ref 65–140)
GLUCOSE UR STRIP-MCNC: NEGATIVE MG/DL
HCT VFR BLD AUTO: 41.8 % (ref 34.8–46.1)
HGB BLD-MCNC: 14.2 G/DL (ref 11.5–15.4)
HGB UR QL STRIP.AUTO: NEGATIVE
IMM GRANULOCYTES # BLD AUTO: 0.04 THOUSAND/UL (ref 0–0.2)
IMM GRANULOCYTES NFR BLD AUTO: 0 % (ref 0–2)
KETONES UR STRIP-MCNC: NEGATIVE MG/DL
LEUKOCYTE ESTERASE UR QL STRIP: ABNORMAL
LIPASE SERPL-CCNC: 121 U/L (ref 73–393)
LYMPHOCYTES # BLD AUTO: 2.71 THOUSANDS/ΜL (ref 0.6–4.47)
LYMPHOCYTES NFR BLD AUTO: 27 % (ref 14–44)
MCH RBC QN AUTO: 32 PG (ref 26.8–34.3)
MCHC RBC AUTO-ENTMCNC: 34 G/DL (ref 31.4–37.4)
MCV RBC AUTO: 94 FL (ref 82–98)
MONOCYTES # BLD AUTO: 0.7 THOUSAND/ΜL (ref 0.17–1.22)
MONOCYTES NFR BLD AUTO: 7 % (ref 4–12)
NEUTROPHILS # BLD AUTO: 6.27 THOUSANDS/ΜL (ref 1.85–7.62)
NEUTS SEG NFR BLD AUTO: 62 % (ref 43–75)
NITRITE UR QL STRIP: NEGATIVE
NON-SQ EPI CELLS URNS QL MICRO: ABNORMAL /HPF
NRBC BLD AUTO-RTO: 0 /100 WBCS
PH UR STRIP.AUTO: 5.5 [PH]
PLATELET # BLD AUTO: 374 THOUSANDS/UL (ref 149–390)
PMV BLD AUTO: 9.8 FL (ref 8.9–12.7)
POTASSIUM SERPL-SCNC: 3.9 MMOL/L (ref 3.5–5.3)
PROT SERPL-MCNC: 7.1 G/DL (ref 6.4–8.2)
PROT UR STRIP-MCNC: NEGATIVE MG/DL
RBC # BLD AUTO: 4.44 MILLION/UL (ref 3.81–5.12)
RBC #/AREA URNS AUTO: ABNORMAL /HPF
SODIUM SERPL-SCNC: 139 MMOL/L (ref 136–145)
SP GR UR STRIP.AUTO: <=1.005 (ref 1–1.03)
UROBILINOGEN UR QL STRIP.AUTO: 0.2 E.U./DL
WBC # BLD AUTO: 10.14 THOUSAND/UL (ref 4.31–10.16)
WBC #/AREA URNS AUTO: ABNORMAL /HPF

## 2020-06-15 PROCEDURE — 81001 URINALYSIS AUTO W/SCOPE: CPT | Performed by: EMERGENCY MEDICINE

## 2020-06-15 PROCEDURE — 81025 URINE PREGNANCY TEST: CPT | Performed by: EMERGENCY MEDICINE

## 2020-06-15 PROCEDURE — 36415 COLL VENOUS BLD VENIPUNCTURE: CPT | Performed by: EMERGENCY MEDICINE

## 2020-06-15 PROCEDURE — 96361 HYDRATE IV INFUSION ADD-ON: CPT

## 2020-06-15 PROCEDURE — 83690 ASSAY OF LIPASE: CPT | Performed by: EMERGENCY MEDICINE

## 2020-06-15 PROCEDURE — 99284 EMERGENCY DEPT VISIT MOD MDM: CPT | Performed by: EMERGENCY MEDICINE

## 2020-06-15 PROCEDURE — 99284 EMERGENCY DEPT VISIT MOD MDM: CPT

## 2020-06-15 PROCEDURE — 85025 COMPLETE CBC W/AUTO DIFF WBC: CPT | Performed by: EMERGENCY MEDICINE

## 2020-06-15 PROCEDURE — 96360 HYDRATION IV INFUSION INIT: CPT

## 2020-06-15 PROCEDURE — 80053 COMPREHEN METABOLIC PANEL: CPT | Performed by: EMERGENCY MEDICINE

## 2020-06-15 PROCEDURE — 74177 CT ABD & PELVIS W/CONTRAST: CPT

## 2020-06-15 RX ORDER — CYCLOBENZAPRINE HCL 10 MG
10 TABLET ORAL 3 TIMES DAILY PRN
Qty: 20 TABLET | Refills: 0 | Status: SHIPPED | OUTPATIENT
Start: 2020-06-15 | End: 2020-06-15 | Stop reason: SDUPTHER

## 2020-06-15 RX ORDER — CYCLOBENZAPRINE HCL 10 MG
10 TABLET ORAL 3 TIMES DAILY PRN
Qty: 20 TABLET | Refills: 0 | Status: SHIPPED | OUTPATIENT
Start: 2020-06-15 | End: 2020-09-25 | Stop reason: ALTCHOICE

## 2020-06-15 RX ORDER — TRAMADOL HYDROCHLORIDE 50 MG/1
50 TABLET ORAL EVERY 6 HOURS PRN
Qty: 15 TABLET | Refills: 0 | Status: SHIPPED | OUTPATIENT
Start: 2020-06-15 | End: 2020-06-25

## 2020-06-15 RX ADMIN — SODIUM CHLORIDE 1000 ML: 0.9 INJECTION, SOLUTION INTRAVENOUS at 18:13

## 2020-06-15 RX ADMIN — IOHEXOL 100 ML: 350 INJECTION, SOLUTION INTRAVENOUS at 18:56

## 2020-06-16 ENCOUNTER — OFFICE VISIT (OUTPATIENT)
Dept: FAMILY MEDICINE CLINIC | Facility: CLINIC | Age: 50
End: 2020-06-16
Payer: COMMERCIAL

## 2020-06-16 VITALS
HEIGHT: 62 IN | WEIGHT: 189 LBS | HEART RATE: 72 BPM | RESPIRATION RATE: 16 BRPM | DIASTOLIC BLOOD PRESSURE: 84 MMHG | OXYGEN SATURATION: 97 % | TEMPERATURE: 98.4 F | SYSTOLIC BLOOD PRESSURE: 128 MMHG | BODY MASS INDEX: 34.78 KG/M2

## 2020-06-16 DIAGNOSIS — Z11.4 SCREENING FOR HIV (HUMAN IMMUNODEFICIENCY VIRUS): ICD-10-CM

## 2020-06-16 DIAGNOSIS — R79.89 ABNORMAL TSH: ICD-10-CM

## 2020-06-16 DIAGNOSIS — G89.29 CHRONIC RIGHT-SIDED LOW BACK PAIN WITH RIGHT-SIDED SCIATICA: ICD-10-CM

## 2020-06-16 DIAGNOSIS — Z13.220 SCREENING FOR LIPOID DISORDERS: ICD-10-CM

## 2020-06-16 DIAGNOSIS — Z12.4 CERVICAL CANCER SCREENING: Primary | ICD-10-CM

## 2020-06-16 DIAGNOSIS — M54.41 CHRONIC RIGHT-SIDED LOW BACK PAIN WITH RIGHT-SIDED SCIATICA: ICD-10-CM

## 2020-06-16 DIAGNOSIS — Z12.31 SCREENING MAMMOGRAM, ENCOUNTER FOR: ICD-10-CM

## 2020-06-16 DIAGNOSIS — M25.50 ARTHRALGIA, UNSPECIFIED JOINT: ICD-10-CM

## 2020-06-16 DIAGNOSIS — R10.9 FLANK PAIN: ICD-10-CM

## 2020-06-16 DIAGNOSIS — E66.09 CLASS 1 OBESITY DUE TO EXCESS CALORIES WITHOUT SERIOUS COMORBIDITY WITH BODY MASS INDEX (BMI) OF 34.0 TO 34.9 IN ADULT: ICD-10-CM

## 2020-06-16 PROCEDURE — 4004F PT TOBACCO SCREEN RCVD TLK: CPT | Performed by: FAMILY MEDICINE

## 2020-06-16 PROCEDURE — 99204 OFFICE O/P NEW MOD 45 MIN: CPT | Performed by: FAMILY MEDICINE

## 2020-06-16 PROCEDURE — 3008F BODY MASS INDEX DOCD: CPT | Performed by: FAMILY MEDICINE

## 2020-06-17 ENCOUNTER — EVALUATION (OUTPATIENT)
Dept: PHYSICAL THERAPY | Facility: CLINIC | Age: 50
End: 2020-06-17
Payer: COMMERCIAL

## 2020-06-17 DIAGNOSIS — R10.9 RIGHT FLANK PAIN: Primary | ICD-10-CM

## 2020-06-17 PROCEDURE — 97162 PT EVAL MOD COMPLEX 30 MIN: CPT | Performed by: PHYSICAL THERAPIST

## 2020-06-17 PROCEDURE — 97110 THERAPEUTIC EXERCISES: CPT | Performed by: PHYSICAL THERAPIST

## 2020-06-23 ENCOUNTER — OFFICE VISIT (OUTPATIENT)
Dept: PHYSICAL THERAPY | Facility: CLINIC | Age: 50
End: 2020-06-23
Payer: COMMERCIAL

## 2020-06-23 DIAGNOSIS — R10.9 RIGHT FLANK PAIN: Primary | ICD-10-CM

## 2020-06-23 PROCEDURE — 97112 NEUROMUSCULAR REEDUCATION: CPT | Performed by: PHYSICAL THERAPIST

## 2020-06-23 PROCEDURE — 97110 THERAPEUTIC EXERCISES: CPT | Performed by: PHYSICAL THERAPIST

## 2020-06-23 PROCEDURE — 97140 MANUAL THERAPY 1/> REGIONS: CPT | Performed by: PHYSICAL THERAPIST

## 2020-06-24 ENCOUNTER — OFFICE VISIT (OUTPATIENT)
Dept: PHYSICAL THERAPY | Facility: CLINIC | Age: 50
End: 2020-06-24
Payer: COMMERCIAL

## 2020-06-24 ENCOUNTER — TELEMEDICINE (OUTPATIENT)
Dept: FAMILY MEDICINE CLINIC | Facility: CLINIC | Age: 50
End: 2020-06-24
Payer: COMMERCIAL

## 2020-06-24 ENCOUNTER — APPOINTMENT (OUTPATIENT)
Dept: LAB | Facility: CLINIC | Age: 50
End: 2020-06-24
Payer: COMMERCIAL

## 2020-06-24 VITALS — WEIGHT: 189 LBS | BODY MASS INDEX: 34.78 KG/M2 | HEIGHT: 62 IN

## 2020-06-24 DIAGNOSIS — Z11.4 SCREENING FOR HIV (HUMAN IMMUNODEFICIENCY VIRUS): ICD-10-CM

## 2020-06-24 DIAGNOSIS — Z20.828 EXPOSURE TO SARS-ASSOCIATED CORONAVIRUS: Primary | ICD-10-CM

## 2020-06-24 DIAGNOSIS — R79.89 ABNORMAL TSH: ICD-10-CM

## 2020-06-24 DIAGNOSIS — Z13.220 SCREENING FOR LIPOID DISORDERS: ICD-10-CM

## 2020-06-24 DIAGNOSIS — M25.50 ARTHRALGIA, UNSPECIFIED JOINT: ICD-10-CM

## 2020-06-24 DIAGNOSIS — R10.9 RIGHT FLANK PAIN: Primary | ICD-10-CM

## 2020-06-24 DIAGNOSIS — Z20.828 EXPOSURE TO SARS-ASSOCIATED CORONAVIRUS: ICD-10-CM

## 2020-06-24 LAB
CHOLEST SERPL-MCNC: 271 MG/DL (ref 50–200)
CRP SERPL QL: 6.8 MG/L
ERYTHROCYTE [SEDIMENTATION RATE] IN BLOOD: 10 MM/HOUR (ref 0–20)
HDLC SERPL-MCNC: 52 MG/DL
LDLC SERPL CALC-MCNC: 185 MG/DL (ref 0–100)
NONHDLC SERPL-MCNC: 219 MG/DL
RHEUMATOID FACT SER QL LA: NEGATIVE
TRIGL SERPL-MCNC: 172 MG/DL
TSH SERPL DL<=0.05 MIU/L-ACNC: 4.95 UIU/ML (ref 0.36–3.74)

## 2020-06-24 PROCEDURE — 99441 PR PHYS/QHP TELEPHONE EVALUATION 5-10 MIN: CPT | Performed by: FAMILY MEDICINE

## 2020-06-24 PROCEDURE — 36415 COLL VENOUS BLD VENIPUNCTURE: CPT

## 2020-06-24 PROCEDURE — 86430 RHEUMATOID FACTOR TEST QUAL: CPT

## 2020-06-24 PROCEDURE — 87389 HIV-1 AG W/HIV-1&-2 AB AG IA: CPT

## 2020-06-24 PROCEDURE — 86038 ANTINUCLEAR ANTIBODIES: CPT

## 2020-06-24 PROCEDURE — 97110 THERAPEUTIC EXERCISES: CPT

## 2020-06-24 PROCEDURE — 80061 LIPID PANEL: CPT

## 2020-06-24 PROCEDURE — 86140 C-REACTIVE PROTEIN: CPT

## 2020-06-24 PROCEDURE — 84443 ASSAY THYROID STIM HORMONE: CPT

## 2020-06-24 PROCEDURE — 85652 RBC SED RATE AUTOMATED: CPT

## 2020-06-24 PROCEDURE — 97112 NEUROMUSCULAR REEDUCATION: CPT

## 2020-06-24 PROCEDURE — U0003 INFECTIOUS AGENT DETECTION BY NUCLEIC ACID (DNA OR RNA); SEVERE ACUTE RESPIRATORY SYNDROME CORONAVIRUS 2 (SARS-COV-2) (CORONAVIRUS DISEASE [COVID-19]), AMPLIFIED PROBE TECHNIQUE, MAKING USE OF HIGH THROUGHPUT TECHNOLOGIES AS DESCRIBED BY CMS-2020-01-R: HCPCS

## 2020-06-25 LAB — SARS-COV-2 RNA SPEC QL NAA+PROBE: NOT DETECTED

## 2020-06-26 DIAGNOSIS — E03.8 SUBCLINICAL HYPOTHYROIDISM: Primary | ICD-10-CM

## 2020-06-26 LAB
HIV 1+2 AB+HIV1 P24 AG SERPL QL IA: NORMAL
RYE IGE QN: NEGATIVE

## 2020-06-30 ENCOUNTER — OFFICE VISIT (OUTPATIENT)
Dept: PHYSICAL THERAPY | Facility: CLINIC | Age: 50
End: 2020-06-30
Payer: COMMERCIAL

## 2020-06-30 DIAGNOSIS — R10.9 RIGHT FLANK PAIN: Primary | ICD-10-CM

## 2020-06-30 PROCEDURE — 97140 MANUAL THERAPY 1/> REGIONS: CPT | Performed by: PHYSICAL THERAPIST

## 2020-06-30 PROCEDURE — 97110 THERAPEUTIC EXERCISES: CPT | Performed by: PHYSICAL THERAPIST

## 2020-07-09 ENCOUNTER — OFFICE VISIT (OUTPATIENT)
Dept: PHYSICAL THERAPY | Facility: CLINIC | Age: 50
End: 2020-07-09
Payer: COMMERCIAL

## 2020-07-09 DIAGNOSIS — R10.9 RIGHT FLANK PAIN: Primary | ICD-10-CM

## 2020-07-09 PROCEDURE — 97110 THERAPEUTIC EXERCISES: CPT | Performed by: PHYSICAL THERAPIST

## 2020-07-09 PROCEDURE — 97140 MANUAL THERAPY 1/> REGIONS: CPT | Performed by: PHYSICAL THERAPIST

## 2020-07-09 NOTE — PROGRESS NOTES
Daily Note     Today's date: 2020  Patient name: David Higgins  : 1970  MRN: [id number]  Referring provider: Patel Alaniz DO  Dx:   Encounter Diagnosis     ICD-10-CM    1  Right flank pain R10 9                   Subjective: 6/10 pain to start  Driving trip aggravated her back  Feels good if "doing nothing" but has increased pain with twisting/bending/sitting/standing prolonged  Objective: See treatment diary below      Assessment: Tolerated treatment well  Patient would benefit from continued PT      Plan: Continue per plan of care        Precautions: (-)    Manuals  7        Prone Lx PA Mobs  8'/pain AW 8' 8'                                               Neuro Re-Ed             Abd Logan  :05 20 :05 20 :05 20 :05 20        Mult TB Rot    :05 20 Blue  :03 20 Blue        Bridges  :03 20 :03 20 :03 20 :03 20                                                            Ther Ex             HEP 10                         EIL  20 15 20 30        EIL pt OP  10 10 20 20        Sust Ext  8' 8' 8'         EIS  30 20 20 30        EIS Belt OP  20 15 20 30        Prone Hip Ext     B 20        Standing Hip Ext    20 20        Ther Activity             Pt ed body mechs  Back acct 10'                        Gait Training                                       Modalities

## 2020-07-13 ENCOUNTER — EVALUATION (OUTPATIENT)
Dept: PHYSICAL THERAPY | Facility: CLINIC | Age: 50
End: 2020-07-13
Payer: COMMERCIAL

## 2020-07-13 DIAGNOSIS — R10.9 RIGHT FLANK PAIN: Primary | ICD-10-CM

## 2020-07-13 PROCEDURE — 97110 THERAPEUTIC EXERCISES: CPT | Performed by: PHYSICAL THERAPIST

## 2020-07-13 PROCEDURE — 97112 NEUROMUSCULAR REEDUCATION: CPT | Performed by: PHYSICAL THERAPIST

## 2020-07-13 PROCEDURE — 97140 MANUAL THERAPY 1/> REGIONS: CPT | Performed by: PHYSICAL THERAPIST

## 2020-07-13 NOTE — PROGRESS NOTES
Daily Note     Today's date: 2020  Patient name: Stanley Soulier  : 1970  MRN: [id number]  Referring provider: Derril Saint, DO  Dx:   Encounter Diagnosis     ICD-10-CM    1  Right flank pain R10 9                   Subjective: 5-6/10 pain to start  Overall pain remains "aobut the same"      Objective: See treatment diary below      Assessment: Tolerated treatment well  Patient would benefit from continued PT      Plan: Continue per plan of care        Precautions: (-)    Manuals        Prone Lx PA Mobs  8'/pain AW 8' 8' 8' in R SG                                              Neuro Re-Ed             Abd Logan  :05 20 :05 20 :05 20 :05 20 :05 20       Mult TB Rot    :05 20 Blue  :03 20 Blue :03 20 B       Bridges  :03 20 :03 20 :03 20 :03 20 :03 20                                                           Ther Ex             HEP 10                         EIL  20 15 20 30 30       EIL pt OP  10 10 20 20 30       Sust Ext  8' 8' 8'  8' in R SG       EIS  30 20 20 30 30       EIS Belt OP  20 15 20 30 30       Prone Hip Ext     B 20 B 20 6x       HS Str      :20/6       Standing Hip Ext    20 20 B 20       Ther Activity             Pt ed body mechs  Back acct 10'                        Gait Training                                       Modalities

## 2020-07-20 ENCOUNTER — TELEPHONE (OUTPATIENT)
Dept: FAMILY MEDICINE CLINIC | Facility: CLINIC | Age: 50
End: 2020-07-20

## 2020-07-20 NOTE — TELEPHONE ENCOUNTER
The office received 2 of her mail back "Not at his Address"  I left 2 messages asking for a call back with the correct address

## 2020-07-21 ENCOUNTER — APPOINTMENT (OUTPATIENT)
Dept: PHYSICAL THERAPY | Facility: CLINIC | Age: 50
End: 2020-07-21
Payer: COMMERCIAL

## 2020-07-21 NOTE — TELEPHONE ENCOUNTER
Miguelina Moore called back with another address, printed and mail her [facility] lab slips [facility] [address]

## 2020-07-21 NOTE — TELEPHONE ENCOUNTER
Zenon Monteiro called back  I changed her address   I also called [place] in Medical Records at [phone] for her records from [facility]   [place] is asking the supervisor the procedure to obtains these records

## 2020-07-30 ENCOUNTER — EVALUATION (OUTPATIENT)
Dept: PHYSICAL THERAPY | Facility: CLINIC | Age: 50
End: 2020-07-30
Payer: COMMERCIAL

## 2020-07-30 DIAGNOSIS — R10.9 RIGHT FLANK PAIN: Primary | ICD-10-CM

## 2020-07-30 PROCEDURE — 97140 MANUAL THERAPY 1/> REGIONS: CPT | Performed by: PHYSICAL THERAPIST

## 2020-07-30 PROCEDURE — 97112 NEUROMUSCULAR REEDUCATION: CPT | Performed by: PHYSICAL THERAPIST

## 2020-07-30 PROCEDURE — 97110 THERAPEUTIC EXERCISES: CPT | Performed by: PHYSICAL THERAPIST

## 2020-07-30 NOTE — PROGRESS NOTES
PT ReEvaluation and Discharge    Today's date: 2020  Patient name: Jeanmarie Singh  : 1970  MRN: [id number]  Referring provider: Homar Schmidt DO  Dx:   Encounter Diagnosis     ICD-10-CM    1  Right flank pain R10 9                   Assessment  Assessment details: Patient has been compliant with HEP and attending PT sessions but has not made significant progress  Given this, she will be placed on hold from PT at this time  She has an appointment with Pain Management tomorrow  Thank you for this pleasant referral     Impairments: abnormal or restricted ROM, activity intolerance, impaired physical strength and pain with function  Understanding of Dx/Px/POC: good   Prognosis: good    Goals  ST-6 weeks  1  Patient to be independent with HEP - Met  2  Decrease pain at least 2 subjective levels  - Partially Met    LT-12 weeks  1    Patient to voice comfort with self management of condition - Met  2   75% or > decreased pain  - Not Met  3   75% or > decreased functional deficits  - Not Met  4  Normalize AROM of all deficit planes -Not Met  7  Patient to voice understanding of activities/positions to avoid  - Met      Plan  Patient would benefit from: skilled PT  Referral necessary: No  Planned modality interventions: cryotherapy  Planned therapy interventions: IADL retraining, joint mobilization, manual therapy, motor coordination training, neuromuscular re-education, patient education, postural training, self care, strengthening, stretching, therapeutic activities, therapeutic exercise, home exercise program, flexibility, ADL training, balance and body mechanics training  Treatment plan discussed with: patient        Subjective Evaluation    History of Present Illness  Onset date: 3 weeks ago  Mechanism of injury: Patient reports no significant change in symptoms since beginning therapy  She continues to have constant pain but intensity of pain has reduced at worst slightly    She notes stairs and laundry especially are the most aggravating activities for her        Quality of life: good    Pain  At best pain ratin  At worst pain ratin  Location: R low back          Objective     Active Range of Motion     Lumbar   Flexion:  with pain Restriction level: moderate  Extension:  Restriction level: minimal    Joint Play     Hypomobile: L2, L3, L4, L5 and S1     Pain: L2, L3, L4, L5 and S1   Mechanical Assessment    Cervical      Thoracic      Lumbar    Standing flexion: repeated movements   Pain intensity: worse  Pain level: increased  Lying flexion: repeated movements  Pain intensity: worse  Standing extension: repeated movements  Pain location: no change  Lying extension: repeated movements  Pain intensity: better  Pain level: decreased    General Comments:      Lumbar Comments  Slump: (-)  Neuromotor Screen: (-)    SLR R 30; L40  XSLR: (-)    Hip Screen: (-)    LEONARD (+)- Mild  (-) Prone Instability Test               Precautions: (-)  Manuals  7      Prone Lx PA Mobs  8'/pain AW 8' 8' 8' in R SG 8'                                             Neuro Re-Ed             Abd Logan  :05 20 :05 20 :05 20 :05 20 :05 20 :5 20      Mult TB Rot    :05 20 Blue  :03 20 Blue :03 20 B :03 20 B      Bridges  :03 20 :03 20 :03 20 :03 20 :03 20 :03 20                                                          Ther Ex             HEP 10                         EIL  20 15 20 30 30 30      EIL pt OP  10 10 20 20 30 30      Sust Ext  8' 8' 8'  8' in R SG 8'' R SG      EIS  30 20 20 30 30 30      EIS Belt OP  20 15 20 30 30 30      Prone Hip Ext     B 20 B 20 6x B 20 6x      HS Str      :20/6 :20/6      Standing Hip Ext    20 20 B 20 B 20      Ther Activity             Pt ed body mechs  Back acct 10'                        Gait Training                                       Modalities

## 2020-08-10 ENCOUNTER — TELEPHONE (OUTPATIENT)
Dept: FAMILY MEDICINE CLINIC | Facility: CLINIC | Age: 50
End: 2020-08-10

## 2020-08-10 DIAGNOSIS — G89.29 CHRONIC PAIN OF BOTH KNEES: Primary | ICD-10-CM

## 2020-08-10 DIAGNOSIS — M25.562 CHRONIC PAIN OF BOTH KNEES: Primary | ICD-10-CM

## 2020-08-10 DIAGNOSIS — M25.561 CHRONIC PAIN OF BOTH KNEES: Primary | ICD-10-CM

## 2020-08-10 NOTE — TELEPHONE ENCOUNTER
Nguyen Monsivais is calling for an orthro referral for knee  I have been trying to get her chart from [facility] placed into her  Working with medical records  Please call when order is placed      Caller: Ermelinda  Phone#:[phone]

## 2020-08-28 ENCOUNTER — CONSULT (OUTPATIENT)
Dept: OBGYN CLINIC | Facility: CLINIC | Age: 50
End: 2020-08-28
Payer: COMMERCIAL

## 2020-08-28 VITALS
BODY MASS INDEX: 32.43 KG/M2 | HEART RATE: 100 BPM | HEIGHT: 63 IN | WEIGHT: 183 LBS | SYSTOLIC BLOOD PRESSURE: 161 MMHG | DIASTOLIC BLOOD PRESSURE: 90 MMHG

## 2020-08-28 DIAGNOSIS — M17.11 PRIMARY OSTEOARTHRITIS OF RIGHT KNEE: ICD-10-CM

## 2020-08-28 DIAGNOSIS — M17.12 PRIMARY OSTEOARTHRITIS OF LEFT KNEE: Primary | ICD-10-CM

## 2020-08-28 PROBLEM — M17.0 PRIMARY OSTEOARTHRITIS OF BOTH KNEES: Status: ACTIVE | Noted: 2018-05-14

## 2020-08-28 PROCEDURE — 99203 OFFICE O/P NEW LOW 30 MIN: CPT | Performed by: ORTHOPAEDIC SURGERY

## 2020-08-28 PROCEDURE — 3008F BODY MASS INDEX DOCD: CPT | Performed by: FAMILY MEDICINE

## 2020-08-28 NOTE — PROGRESS NOTES
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  1  As patient has failed cortisone injections, offered to perform visco injections into bilateral knees  She was in agreement with this treatment plan and wished to proceed  Will be pre certed for visco injections  2  Take OTC anti inflammatories and ice prn for pain relief  3  May perform activities as tolerated  Avoid painful maneuvers  4  No indications for MRI or surgery at this time  5  Follow up when visco injections become available    Chief Complaint     Bilateral Knee Pain    History of the Present Illness     Sarah Luciano is a 52 y o  female presents today for orthopedic surgery consultation requested by Dr Cheikh Knott for bilateral knee pain  Patient states that her pain/symptoms have been present for years, since approximately 2012  No history of injury to her knees  She states that her pain has worsened over the past several months  She reports that she has intermittent "locking" episodes associated with severe pain  She works as a  and  and states by the end of the day her pain worsens  She localizes her pain to the medial aspect  Previously, she has had 2 corticosteroid injections in the right knee and 1 corticosteroid injection in the left knee with Dr Adri Beaver with minimal benefit  She has also tried bracing with no benefit  She tried physical therapy in the past, which she felt was counter-productive  She takes OTC NSAIDs occasionally with minimal relief  Prior workup includes x-rays and an MRI for the right knee, which showed "something under the kneecap " Dr Adri Bevaer advised against surgery based on the MRI findings      Physical Exam     /90   Pulse 100   Ht 5' 2 75" (1 594 m)   Wt 83 kg (183 lb)   BMI 32 68 kg/m²     Right knee:  Effusion:  None  Tenderness:  Medial aspect  Range of motion:  Extension:  0  Flexion:  120  Lachman test:  Stable  Valgus stress: Stable  Varus stress:  Stable  Posterior drawer test:  Stable  Roberto Carlos's test:  Negative    Left  knee:  Effusion:  None  Tenderness:  Medial aspect  Range of motion:  Extension:  0  Flexion:  120  Lachman test:  Stable  Valgus stress:  Stable  Varus stress:  Stable  Posterior drawer test:  Stable  Roberto Carlos's test:  Negative    Eyes:  Anicteric sclerae  Neck:  Supple  Lungs:  Normal respiratory effort  Cardiovascular:  Capillary refill is less than 2 seconds  Skin:  Intact without erythema  Neurologic:  Sensation grossly intact to light touch  Psychiatric:  Mood and affect are appropriate  Data Review     I have personally reviewed pertinent films in PACS, and my interpretation follows:    X Ray Right Knee 10/14/2019: Mild to moderate medial compartment degenerative changes  No acute osseous abnormalities      Past Medical History:   Diagnosis Date    Anxiety     Asthma     Heavy menses     Irregular menses     Knee injury     Left knee     Palpitations     PONV (postoperative nausea and vomiting)     Seasonal allergies     Shortness of breath     "sometimes"    Trigger finger of right thumb     Wears glasses        Past Surgical History:   Procedure Laterality Date    OTHER SURGICAL HISTORY      Ablation of uterus    NC CONIZATION CERVIX,LOOP ELECTRD N/A 12/6/2016    Procedure: BIOPSY LEEP CERVIX;  Surgeon: Leigh Nelson DO;  Location: [facility];  Service: Gynecology    NC HYSTEROSCOPY,W/ENDO BX N/A 12/6/2016    Procedure: DILATATION AND CURETTAGE (D&C) WITH HYSTEROSCOPY;  Surgeon: Leigh Nelson DO;  [facility];  Service: Gynecology    NC HYSTEROSCOPY,W/ENDOMETRIAL ABLATION N/A 12/6/2016    Procedure: ABLATION ENDOMETRIAL Loreli Letty;  Surgeon: Leigh Nelson DO;  Location: [facility];  Service: Gynecology    TUBAL LIGATION         Allergies   Allergen Reactions    Codeine Hives and GI Intolerance       Current Outpatient Medications on File Prior to Visit   Medication Sig Dispense Refill    albuterol (PROVENTIL HFA) 90 mcg/act inhaler Inhale 2 puffs every 6 (six) hours as needed for shortness of breath 1 Inhaler 1    cyclobenzaprine (FLEXERIL) 10 mg tablet Take 1 tablet (10 mg total) by mouth 3 (three) times a day as needed for muscle spasms 20 tablet 0    Multiple Vitamin (MULTIVITAMIN) tablet Take 1 tablet by mouth daily      Multiple Vitamins-Minerals (VITAMIN D3 COMPLETE PO) Take 1,000 capsules by mouth daily      omeprazole (PRILOSEC) 20 mg delayed release capsule Take 1 capsule (20 mg total) by mouth daily 30 capsule 5     No current facility-administered medications on file prior to visit  Social History     Tobacco Use    Smoking status: Current Every Day Smoker     Packs/day: 0 25     Years: 5 00     Pack years: 1 25     Types: Cigarettes    Smokeless tobacco: Current User    Tobacco comment: "would like to quit"   Substance Use Topics    Alcohol use: Yes     Alcohol/week: 2 0 standard drinks     Types: 1 Glasses of wine, 1 Cans of beer per week     Frequency: 2-4 times a month     Drinks per session: 1 or 2     Binge frequency: Never     Comment: socially     Drug use: No       Family History   Problem Relation Age of Onset    Hypertension Mother     Stroke Father     Hypertension Father     No Known Problems Sister     No Known Problems Brother     No Known Problems Son     No Known Problems Daughter     Diabetes Maternal Grandmother     Pneumonia Maternal Grandmother     Cancer Maternal Uncle         Unknown type        Review of Systems     As stated in the HPI  All other systems were reviewed and are negative        Scribe Attestation    I,:   Aurelio Yi am acting as a scribe while in the presence of the attending physician :        I,:   Alline Prader, MD personally performed the services described in this documentation    as scribed in my presence :

## 2020-09-09 ENCOUNTER — TRANSCRIBE ORDERS (OUTPATIENT)
Dept: PAIN MEDICINE | Facility: CLINIC | Age: 50
End: 2020-09-09

## 2020-09-09 ENCOUNTER — HOSPITAL ENCOUNTER (OUTPATIENT)
Dept: RADIOLOGY | Facility: HOSPITAL | Age: 50
Discharge: HOME | End: 2020-09-09
Attending: ANESTHESIOLOGY
Payer: COMMERCIAL

## 2020-09-09 ENCOUNTER — OFFICE VISIT (OUTPATIENT)
Dept: PAIN MEDICINE | Facility: CLINIC | Age: 50
End: 2020-09-09
Payer: COMMERCIAL

## 2020-09-09 VITALS
WEIGHT: 183 LBS | DIASTOLIC BLOOD PRESSURE: 102 MMHG | SYSTOLIC BLOOD PRESSURE: 140 MMHG | HEART RATE: 87 BPM | BODY MASS INDEX: 32.68 KG/M2 | TEMPERATURE: 97.9 F

## 2020-09-09 DIAGNOSIS — M54.41 CHRONIC RIGHT-SIDED LOW BACK PAIN WITH RIGHT-SIDED SCIATICA: ICD-10-CM

## 2020-09-09 DIAGNOSIS — M25.551 RIGHT HIP PAIN: ICD-10-CM

## 2020-09-09 DIAGNOSIS — G89.29 CHRONIC RIGHT-SIDED LOW BACK PAIN WITH RIGHT-SIDED SCIATICA: ICD-10-CM

## 2020-09-09 DIAGNOSIS — M51.16 INTERVERTEBRAL DISC DISORDER WITH RADICULOPATHY OF LUMBAR REGION: Primary | ICD-10-CM

## 2020-09-09 PROCEDURE — 99204 OFFICE O/P NEW MOD 45 MIN: CPT | Performed by: ANESTHESIOLOGY

## 2020-09-09 PROCEDURE — 73502 X-RAY EXAM HIP UNI 2-3 VIEWS: CPT

## 2020-09-09 NOTE — PATIENT INSTRUCTIONS

## 2020-09-09 NOTE — PROGRESS NOTES
Assessment  1  Intervertebral disc disorder with radiculopathy of lumbar region    2  Right hip pain    3  Chronic right-sided low back pain with right-sided sciatica        Plan  The patient's symptoms, history/physical are consistent with pain that is multifactorial in origin but predominantly the result of a likely L4 radiculopathy which is leading to the right leg symptoms  At this time, I discussed treatment that will be multimodal in approach  I will order x-rays of the right hip and pelvis to evaluate for any hip pathology that is contributing to his symptoms and I will also order an MRI of the lumbar spine to evaluate for the L4 radiculopathy  I advised we will call with the results and discuss treatment moving forward which may include performing an epidural steroid injection  For now, she will continue with ibuprofen and Tylenol as needed since this is effective  My impressions and treatment recommendations were discussed in detail with the patient who verbalized understanding and had no further questions  Discharge instructions were provided  I personally saw and examined the patient and I agree with the above discussed plan of care  Orders Placed This Encounter   Procedures    XR hip/pelv 2-3 vws right if performed     Standing Status:   Future     Standing Expiration Date:   9/9/2024     Scheduling Instructions:      Bring along any outside films relating to this procedure  Order Specific Question:   Is the patient pregnant? Answer:   Unknown    MRI lumbar spine without contrast     Standing Status:   Future     Standing Expiration Date:   9/9/2024     Scheduling Instructions: There is no preparation for this test  Please leave your jewelry and valuables at home, wedding rings are the exception  Magnetic nail polish must be removed prior to arrival for your test  Please bring your insurance cards, a form of photo ID and a list of your medications with you   Arrive 15 minutes prior to your appointment time in order to register  Please bring any prior CT or MRI studies of this area that were not performed at a [facility] facility  To schedule this appointment, please contact Central Scheduling at [phone]  Prior to your appointment, please make sure you complete the MRI Screening Form when you e-Check in for your appointment  This will be available starting 7 days before your appointment in [address]  You may receive an e-mail with an activation code if you do not have a [employer] account  If you do not have access to a device, we will complete your screening at your appointment  Order Specific Question:   Is the patient pregnant? Answer:   Unknown     Order Specific Question:   What is the patient's sedation requirement? Answer:   No Sedation     No orders of the defined types were placed in this encounter  History of Present Illness    Meron Hammond is a 52 y o  female who presents for consultation in regards to right lower back and leg pain  Symptoms have been present for about 4 months  She has a history of bilateral knee osteoarthritis worse on the right that she feels has worsened her back pain due to her altering her gait  Symptoms are moderate to severe rated 8/10 numeric rating scale and felt constantly  Pain in the lower back described to be dull, aching, throbbing, sharp and shooting with numbness and paresthesias as well as burning that radiates into the right leg posteriorly and into the anterior thigh  She feels weakness of the right leg but does not use any cane for ambulation  Pain is aggravated lying down, standing, bending, sitting, walking, coughing, sneezing  Treatment history has included physical therapy which provided mild relief right afterwards but then wore off quickly  Tylenol ibuprofen provided moderate relief  She has been referred by her family physician Dr Savannah Lawson for further treatment      I have personally reviewed and/or updated the patient's past medical history, past surgical history, family history, social history, current medications, allergies, and vital signs today  Review of Systems   Constitutional: Negative for fever and unexpected weight change  HENT: Negative for trouble swallowing  Eyes: Negative for visual disturbance  Respiratory: Negative for shortness of breath and wheezing  Cardiovascular: Negative for chest pain and palpitations  Gastrointestinal: Negative for constipation, diarrhea, nausea and vomiting  Endocrine: Negative for cold intolerance, heat intolerance and polydipsia  Genitourinary: Negative for difficulty urinating and frequency  Musculoskeletal: Positive for back pain and gait problem  Negative for arthralgias, joint swelling and myalgias  Skin: Negative for rash  Neurological: Positive for weakness and numbness  Negative for dizziness, seizures, syncope and headaches  Hematological: Does not bruise/bleed easily  Psychiatric/Behavioral: Negative for dysphoric mood  All other systems reviewed and are negative  Patient Active Problem List   Diagnosis    Pap smear abnormality of cervix with LGSIL    Tobacco user    Asthma    Acquired trigger finger    Primary osteoarthritis of both knees    Obesity (BMI 30 0-34  9)    Gastroesophageal reflux disease       Past Medical History:   Diagnosis Date    Anxiety     Asthma     Heavy menses     Irregular menses     Knee injury     Left knee     Palpitations     PONV (postoperative nausea and vomiting)     Seasonal allergies     Shortness of breath     "sometimes"    Trigger finger of right thumb     Wears glasses        Past Surgical History:   Procedure Laterality Date    OTHER SURGICAL HISTORY      Ablation of uterus    [place] CONIZATION CERVIX,LOOP ELECTRD N/A 12/6/2016    Procedure: BIOPSY LEEP CERVIX;  Surgeon: Ana Power DO;  [facility];  Service: Gynecology    [place] HYSTEROSCOPY,W/ENDO BX N/A 12/6/2016    Procedure: DILATATION AND CURETTAGE (D&C) WITH HYSTEROSCOPY;  Surgeon: Morris Duarte DO;  [facility];  Service: Gynecology    [place] HYSTEROSCOPY,W/ENDOMETRIAL ABLATION N/A 12/6/2016    Procedure: Malu Antony;  Surgeon: Morris Duarte DO;  [facility];  Service: Gynecology    TUBAL LIGATION         Family History   Problem Relation Age of Onset    Hypertension Mother     Stroke Father     Hypertension Father     No Known Problems Sister     No Known Problems Brother     No Known Problems Son     No Known Problems Daughter     Diabetes Maternal Grandmother     Pneumonia Maternal Grandmother     Cancer Maternal Uncle         Unknown type        Social History     Occupational History    Not on file   Tobacco Use    Smoking status: Current Every Day Smoker     Packs/day: 0 25     Years: 5 00     Pack years: 1 25     Types: Cigarettes    Smokeless tobacco: Current User    Tobacco comment: "would like to quit"   Substance and Sexual Activity    Alcohol use:  Yes     Alcohol/week: 2 0 standard drinks     Types: 1 Glasses of wine, 1 Cans of beer per week     Frequency: 2-4 times a month     Drinks per session: 1 or 2     Binge frequency: Never     Comment: socially     Drug use: No    Sexual activity: Not Currently       Current Outpatient Medications on File Prior to Visit   Medication Sig    albuterol (PROVENTIL HFA) 90 mcg/act inhaler Inhale 2 puffs every 6 (six) hours as needed for shortness of breath    cyclobenzaprine (FLEXERIL) 10 mg tablet Take 1 tablet (10 mg total) by mouth 3 (three) times a day as needed for muscle spasms    Multiple Vitamin (MULTIVITAMIN) tablet Take 1 tablet by mouth daily    Multiple Vitamins-Minerals (VITAMIN D3 COMPLETE PO) Take 1,000 capsules by mouth daily    omeprazole (PRILOSEC) 20 mg delayed release capsule Take 1 capsule (20 mg total) by mouth daily     No current facility-administered medications on file prior to visit  Allergies   Allergen Reactions    Codeine Hives and GI Intolerance       Physical Exam    BP (!) 140/102   Pulse 87   Temp 97 9 °F (36 6 °C) (Temporal)   Wt 83 kg (183 lb)   BMI 32 68 kg/m²     Constitutional: normal, well developed, well nourished, alert, in no distress and non-toxic and no overt pain behavior  and obese  Eyes: anicteric  HEENT: grossly intact  Neck: supple, symmetric, trachea midline and no masses   Pulmonary:even and unlabored  Cardiovascular:No edema or pitting edema present  Skin:Normal without rashes or lesions and well hydrated  Psychiatric:Mood and affect appropriate  Neurologic:Cranial Nerves II-XII grossly intact  Musculoskeletal:antalgic     Lumbar Spine Exam  Appearance:  Normal lordosis  Palpation/Tenderness:  right lumbar paraspinal tenderness  left sacroiliac joint tenderness  right sacroiliac joint tenderness  right piriformis tenderness  Range of Motion:  Flexion:   Moderately limited  with pain  Extension:  No limitation  without pain  Lateral Flexion - Left:  No limitation  without pain  Lateral Flexion - Right:  Moderately limited  with pain  Motor Strength:  Left hip flexion:  5/5  Left hip extension:  5/5  Right hip flexion:  4/5  Right hip extension:  5/5  Left knee flexion:  5/5  Left knee extension:  5/5  Right knee flexion:  5/5  Right knee extension:  5/5  Left foot dorsiflexion:  5/5  Left foot plantar flexion:  5/5  Right foot dorsiflexion:  5/5  Right foot plantar flexion:  5/5  Reflexes:  Left Patellar:  2+   Right Patellar:  2+   Left Achilles:  2+   Right Achilles:  2+   Special Tests:  Left Straight Leg Test:  negative  Right Straight Leg Test:  positive  Left Tru's Maneuver:  negative  Right Tru's Maneuver:  negative    Imaging      Interface, Rad Results In - 02/10/2020  8:29 AM EST  Technique:  * MRI of the RIGHT knee was performed without IV contrast     * Sagittal PD and T2FS sequences, coronal PD and T1 sequences, axial PD  sequence, and sagittal oblique sequence were obtained according to routine  protocol  Comparison: None available    History: Osteoarthritis RIGHT knee    FINDINGS:    Moderate knee joint effusion  Multiple low T2 subcentimeter loose bodies are  noted within the posterior knee joint (sagittal PD image #14)  No Baker cyst     Mild lateral patellar tilt  No significant patella vee  High-grade chondral fissure involving the medial patellar facet (axial PD image  #9); remaining patellofemoral cartilage is preserved  High-grade cartilage loss,  with areas of full-thickness cartilage loss involving the medial compartment;  mild subchondral marrow edema is also noted, likely reactive  Diffuse, mild  cartilage thinning and irregularity involving the articular surface of the  lateral femoral condyle  No occult fracture  Intraosseous cystic changes are seen along the posterior  tibial plateau  Small medial and lateral marginal osteophytes are present  Large radial tear involving the posterior root attachment of the medial meniscus  with 5 mm of meniscal extrusion into the medial gutter  Lateral meniscus remains  intact  ACL and PCL are intact  Mild bowing of the MCL, with trace fluid in the  deep fibers, without tear  LCL is intact  Posterior lateral corner is  maintained  Quadriceps and patellar tendon are intact  Medial patellofemoral  ligament is intact  Tibial and common peroneal nerve are normal in course and caliber  Muscle bulk  is preserved  No aberrant tibial artery  IMPRESSION:  IMPRESSION:    1  Tricompartmental osteoarthritis, with grade IV chondromalacia of the medial  compartment, with moderate joint effusion and multiple loose intra-articular  loose chondral bodies within the posterior knee joint    2  Radial tear involving the posterior root attachment of the medial meniscus  with meniscal extrusion      3   Mild bowing of the MCL, secondary to meniscal extrusion; trace fluid along  the deep fibers is likely reactive, recommend clinical correlation for grade 1  MCL sprain

## 2020-09-14 ENCOUNTER — TELEPHONE (OUTPATIENT)
Dept: PAIN MEDICINE | Facility: CLINIC | Age: 50
End: 2020-09-14

## 2020-09-14 NOTE — TELEPHONE ENCOUNTER
Please let patient know that x-rays right hip showed mild hip arthritis    Will result of MRI lumbar spine

## 2020-09-25 ENCOUNTER — TELEMEDICINE (OUTPATIENT)
Dept: FAMILY MEDICINE CLINIC | Facility: CLINIC | Age: 50
End: 2020-09-25
Payer: COMMERCIAL

## 2020-09-25 ENCOUNTER — LAB (OUTPATIENT)
Dept: LAB | Facility: CLINIC | Age: 50
End: 2020-09-25
Payer: COMMERCIAL

## 2020-09-25 VITALS
BODY MASS INDEX: 32.43 KG/M2 | OXYGEN SATURATION: 97 % | HEART RATE: 74 BPM | HEIGHT: 63 IN | TEMPERATURE: 98.4 F | WEIGHT: 183 LBS

## 2020-09-25 DIAGNOSIS — Z20.828 EXPOSURE TO SARS-ASSOCIATED CORONAVIRUS: Primary | ICD-10-CM

## 2020-09-25 DIAGNOSIS — E03.8 SUBCLINICAL HYPOTHYROIDISM: ICD-10-CM

## 2020-09-25 LAB — T4 FREE SERPL-MCNC: 1.15 NG/DL (ref 0.76–1.46)

## 2020-09-25 PROCEDURE — 84439 ASSAY OF FREE THYROXINE: CPT

## 2020-09-25 PROCEDURE — 4004F PT TOBACCO SCREEN RCVD TLK: CPT | Performed by: FAMILY MEDICINE

## 2020-09-25 PROCEDURE — 99213 OFFICE O/P EST LOW 20 MIN: CPT | Performed by: FAMILY MEDICINE

## 2020-09-25 PROCEDURE — 86376 MICROSOMAL ANTIBODY EACH: CPT

## 2020-09-25 PROCEDURE — 36415 COLL VENOUS BLD VENIPUNCTURE: CPT

## 2020-09-25 NOTE — PROGRESS NOTES
COVID-19 Virtual Visit     Assessment/Plan:    Problem List Items Addressed This Visit     None      Visit Diagnoses     Exposure to SARS-associated coronavirus    -  Primary  Low index of suspicion, but work requires testing  ordered    Relevant Orders    Novel Coronavirus (COVID-19), PCR [facility] - Collected at [facility] or [facility]        This virtual check-in was done via [employer] and patient was informed that this is a secure, HIPAA-compliant platform  She agrees to proceed     Disposition:      I referred Jeanne Blankenship to one of our centralized sites for a COVID-19 swab    I spent 10 minutes directly with the patient during this visit    Encounter provider Gaby De La O DO    Provider located at [address] [place] [address],[address]  [address]  [facility] [address] [phone]    Recent Visits  No visits were found meeting these conditions  Showing recent visits within past 7 days and meeting all other requirements     Today's Visits  Date Type Provider Dept   09/25/20 Telemedicine [facility] today's visits and meeting all other requirements     Future Appointments  No visits were found meeting these conditions  Showing future appointments within next 150 days and meeting all other requirements        Patient agrees to participate in a virtual check in via telephone or video visit instead of presenting to the office to address urgent/immediate medical needs  Patient is aware this is a billable service  After connecting through [employer], the patient was identified by name and date of birth  Kaia Preciado was informed that this was a telemedicine visit and that the exam was being conducted confidentially over secure lines  My office door was closed  No one else was in the room  Kaia Preciado acknowledged consent and understanding of privacy and security of the telemedicine visit    I informed the patient that I have reviewed her record in Epic and presented the opportunity for her to ask any questions regarding the visit today  The patient agreed to participate  Sarah Beth Damon is a 52 y o  female who is concerned about COVID-19  She reports sore throat, diarrhea and nausea  She has not experienced fever, chills, cough, shortness of breath, anorexia, myalgias, nausea and vomiting She has not had contact with a person who is under investigation for or who is positive for COVID-19 within the last 14 days  She has not been hospitalized recently for fever and/or lower respiratory symptoms      Past Medical History:   Diagnosis Date    Anxiety     Asthma     Heavy menses     Irregular menses     Knee injury     Left knee     Palpitations     PONV (postoperative nausea and vomiting)     Seasonal allergies     Shortness of breath     "sometimes"    Trigger finger of right thumb     Wears glasses        Past Surgical History:   Procedure Laterality Date    OTHER SURGICAL HISTORY      Ablation of uterus    [place] CONIZATION CERVIX,LOOP ELECTRD N/A 12/6/2016    Procedure: BIOPSY LEEP CERVIX;  Surgeon: Darvin Johnson DO;  [facility];  Service: Gynecology    [place] HYSTEROSCOPY,W/ENDO BX N/A 12/6/2016    Procedure: DILATATION AND CURETTAGE (D&C) WITH HYSTEROSCOPY;  Surgeon: Darvin Johnson DO;  [facility];  Service: Gynecology    [place] HYSTEROSCOPY,W/ENDOMETRIAL ABLATION N/A 12/6/2016    Procedure: ABLATION ENDOMETRIAL Rochelle Belch;  Surgeon: Darvin Johnson DO;  [facility];  Service: Gynecology    TUBAL LIGATION         Current Outpatient Medications   Medication Sig Dispense Refill    albuterol (PROVENTIL HFA) 90 mcg/act inhaler Inhale 2 puffs every 6 (six) hours as needed for shortness of breath 1 Inhaler 1    Multiple Vitamin (MULTIVITAMIN) tablet Take 1 tablet by mouth daily      Multiple Vitamins-Minerals (VITAMIN D3 COMPLETE PO) Take 1,000 Units by mouth daily      omeprazole (PRILOSEC) 20 mg delayed release capsule Take 1 capsule (20 mg total) by mouth daily 30 capsule 5     No current facility-administered medications for this visit  Allergies   Allergen Reactions    Codeine Hives and GI Intolerance       Review of Systems    Video Exam    Vitals:    09/25/20 1333   Pulse: 74   Temp: 98 4 °F (36 9 °C)   SpO2: 97%   Weight: 83 kg (183 lb)   Height: 5' 2 75" (1 594 m)         Rell Lopez appears healthy  Physical Exam  Constitutional:       Appearance: Normal appearance  HENT:      Head: Normocephalic and atraumatic  Right Ear: External ear normal       Left Ear: External ear normal    Eyes:      Extraocular Movements: Extraocular movements intact  Conjunctiva/sclera: Conjunctivae normal    Pulmonary:      Effort: Pulmonary effort is normal    Neurological:      Mental Status: She is alert and oriented to person, place, and time  VIRTUAL VISIT [address] [place] [address] acknowledges that she has consented to an online visit or consultation  She understands that the online visit is based solely on information provided by her, and that, in the absence of a face-to-face physical evaluation by the physician, the diagnosis she receives is both limited and provisional in terms of accuracy and completeness  This is not intended to replace a full medical face-to-face evaluation by the physician  Jessica Garcia understands and accepts these terms

## 2020-09-26 LAB — THYROPEROXIDASE AB SERPL-ACNC: <9 IU/ML (ref 0–34)

## 2020-09-30 DIAGNOSIS — Z20.828 EXPOSURE TO SARS-ASSOCIATED CORONAVIRUS: ICD-10-CM

## 2020-09-30 PROCEDURE — U0003 INFECTIOUS AGENT DETECTION BY NUCLEIC ACID (DNA OR RNA); SEVERE ACUTE RESPIRATORY SYNDROME CORONAVIRUS 2 (SARS-COV-2) (CORONAVIRUS DISEASE [COVID-19]), AMPLIFIED PROBE TECHNIQUE, MAKING USE OF HIGH THROUGHPUT TECHNOLOGIES AS DESCRIBED BY CMS-2020-01-R: HCPCS | Performed by: FAMILY MEDICINE

## 2020-10-01 ENCOUNTER — TELEPHONE (OUTPATIENT)
Dept: OBGYN CLINIC | Facility: HOSPITAL | Age: 50
End: 2020-10-01

## 2020-10-01 LAB — SARS-COV-2 RNA SPEC QL NAA+PROBE: NOT DETECTED

## 2020-10-06 ENCOUNTER — TELEPHONE (OUTPATIENT)
Dept: OBGYN CLINIC | Facility: CLINIC | Age: 50
End: 2020-10-06

## 2020-10-07 ENCOUNTER — HOSPITAL ENCOUNTER (OUTPATIENT)
Dept: RADIOLOGY | Age: 50
Discharge: HOME | End: 2020-10-07
Payer: COMMERCIAL

## 2020-10-07 DIAGNOSIS — M51.16 INTERVERTEBRAL DISC DISORDER WITH RADICULOPATHY OF LUMBAR REGION: ICD-10-CM

## 2020-10-07 PROCEDURE — G1004 CDSM NDSC: HCPCS

## 2020-10-07 PROCEDURE — 72148 MRI LUMBAR SPINE W/O DYE: CPT

## 2020-10-13 ENCOUNTER — TELEPHONE (OUTPATIENT)
Dept: PAIN MEDICINE | Facility: CLINIC | Age: 50
End: 2020-10-13

## 2020-10-20 ENCOUNTER — OFFICE VISIT (OUTPATIENT)
Dept: OBGYN CLINIC | Facility: CLINIC | Age: 50
End: 2020-10-20
Payer: COMMERCIAL

## 2020-10-20 ENCOUNTER — TELEPHONE (OUTPATIENT)
Dept: RADIOLOGY | Facility: CLINIC | Age: 50
End: 2020-10-20

## 2020-10-20 VITALS
DIASTOLIC BLOOD PRESSURE: 117 MMHG | HEART RATE: 93 BPM | BODY MASS INDEX: 34.6 KG/M2 | TEMPERATURE: 98.1 F | WEIGHT: 188 LBS | HEIGHT: 62 IN | SYSTOLIC BLOOD PRESSURE: 163 MMHG

## 2020-10-20 DIAGNOSIS — M51.16 INTERVERTEBRAL DISC DISORDER WITH RADICULOPATHY OF LUMBAR REGION: Primary | ICD-10-CM

## 2020-10-20 DIAGNOSIS — M17.0 PRIMARY OSTEOARTHRITIS OF BOTH KNEES: Primary | ICD-10-CM

## 2020-10-20 PROCEDURE — 20610 DRAIN/INJ JOINT/BURSA W/O US: CPT | Performed by: ORTHOPAEDIC SURGERY

## 2020-10-22 ENCOUNTER — TELEPHONE (OUTPATIENT)
Dept: FAMILY MEDICINE CLINIC | Facility: CLINIC | Age: 50
End: 2020-10-22

## 2020-10-27 ENCOUNTER — OFFICE VISIT (OUTPATIENT)
Dept: OBGYN CLINIC | Facility: CLINIC | Age: 50
End: 2020-10-27
Payer: COMMERCIAL

## 2020-10-27 VITALS
DIASTOLIC BLOOD PRESSURE: 118 MMHG | TEMPERATURE: 97.4 F | BODY MASS INDEX: 34.6 KG/M2 | WEIGHT: 188 LBS | SYSTOLIC BLOOD PRESSURE: 177 MMHG | HEIGHT: 62 IN | HEART RATE: 82 BPM

## 2020-10-27 DIAGNOSIS — M17.0 PRIMARY OSTEOARTHRITIS OF BOTH KNEES: Primary | ICD-10-CM

## 2020-10-27 PROCEDURE — 20610 DRAIN/INJ JOINT/BURSA W/O US: CPT | Performed by: ORTHOPAEDIC SURGERY

## 2020-11-03 ENCOUNTER — OFFICE VISIT (OUTPATIENT)
Dept: OBGYN CLINIC | Facility: CLINIC | Age: 50
End: 2020-11-03
Payer: COMMERCIAL

## 2020-11-03 VITALS — WEIGHT: 188 LBS | BODY MASS INDEX: 34.6 KG/M2 | HEIGHT: 62 IN

## 2020-11-03 DIAGNOSIS — M17.0 PRIMARY OSTEOARTHRITIS OF BOTH KNEES: Primary | ICD-10-CM

## 2020-11-03 PROCEDURE — 20610 DRAIN/INJ JOINT/BURSA W/O US: CPT | Performed by: ORTHOPAEDIC SURGERY

## 2020-11-04 ENCOUNTER — TELEPHONE (OUTPATIENT)
Dept: FAMILY MEDICINE CLINIC | Facility: CLINIC | Age: 50
End: 2020-11-04

## 2020-11-04 DIAGNOSIS — G89.29 CHRONIC RIGHT-SIDED LOW BACK PAIN WITH RIGHT-SIDED SCIATICA: Primary | ICD-10-CM

## 2020-11-04 DIAGNOSIS — M54.41 CHRONIC RIGHT-SIDED LOW BACK PAIN WITH RIGHT-SIDED SCIATICA: Primary | ICD-10-CM

## 2020-12-15 ENCOUNTER — OFFICE VISIT (OUTPATIENT)
Dept: OBGYN CLINIC | Facility: CLINIC | Age: 50
End: 2020-12-15
Payer: COMMERCIAL

## 2020-12-15 VITALS
BODY MASS INDEX: 34.96 KG/M2 | HEIGHT: 62 IN | WEIGHT: 190 LBS | HEART RATE: 87 BPM | SYSTOLIC BLOOD PRESSURE: 159 MMHG | DIASTOLIC BLOOD PRESSURE: 103 MMHG

## 2020-12-15 DIAGNOSIS — M17.0 PRIMARY OSTEOARTHRITIS OF BOTH KNEES: Primary | ICD-10-CM

## 2020-12-15 PROCEDURE — 3008F BODY MASS INDEX DOCD: CPT | Performed by: ORTHOPAEDIC SURGERY

## 2020-12-15 PROCEDURE — 99213 OFFICE O/P EST LOW 20 MIN: CPT | Performed by: ORTHOPAEDIC SURGERY

## 2020-12-15 PROCEDURE — 4004F PT TOBACCO SCREEN RCVD TLK: CPT | Performed by: ORTHOPAEDIC SURGERY

## 2020-12-15 RX ORDER — MELOXICAM 15 MG/1
15 TABLET ORAL DAILY
Qty: 30 TABLET | Refills: 1 | Status: SHIPPED | OUTPATIENT
Start: 2020-12-15

## 2020-12-18 DIAGNOSIS — K21.9 GASTROESOPHAGEAL REFLUX DISEASE: ICD-10-CM

## 2020-12-18 RX ORDER — OMEPRAZOLE 20 MG/1
20 CAPSULE, DELAYED RELEASE ORAL DAILY
Qty: 30 CAPSULE | Refills: 5 | Status: SHIPPED | OUTPATIENT
Start: 2020-12-18 | End: 2021-01-27 | Stop reason: SDUPTHER

## 2021-01-27 ENCOUNTER — TELEMEDICINE (OUTPATIENT)
Dept: FAMILY MEDICINE CLINIC | Facility: CLINIC | Age: 51
End: 2021-01-27
Payer: COMMERCIAL

## 2021-01-27 VITALS — HEIGHT: 62 IN | BODY MASS INDEX: 34.96 KG/M2 | WEIGHT: 190 LBS | TEMPERATURE: 98.8 F

## 2021-01-27 DIAGNOSIS — Z20.822 EXPOSURE TO COVID-19 VIRUS: Primary | ICD-10-CM

## 2021-01-27 DIAGNOSIS — B34.9 VIRAL INFECTION, UNSPECIFIED: ICD-10-CM

## 2021-01-27 DIAGNOSIS — Z20.822 EXPOSURE TO COVID-19 VIRUS: ICD-10-CM

## 2021-01-27 PROCEDURE — U0003 INFECTIOUS AGENT DETECTION BY NUCLEIC ACID (DNA OR RNA); SEVERE ACUTE RESPIRATORY SYNDROME CORONAVIRUS 2 (SARS-COV-2) (CORONAVIRUS DISEASE [COVID-19]), AMPLIFIED PROBE TECHNIQUE, MAKING USE OF HIGH THROUGHPUT TECHNOLOGIES AS DESCRIBED BY CMS-2020-01-R: HCPCS | Performed by: FAMILY MEDICINE

## 2021-01-27 PROCEDURE — U0005 INFEC AGEN DETEC AMPLI PROBE: HCPCS | Performed by: FAMILY MEDICINE

## 2021-01-27 PROCEDURE — 3008F BODY MASS INDEX DOCD: CPT | Performed by: ORTHOPAEDIC SURGERY

## 2021-01-27 PROCEDURE — 99214 OFFICE O/P EST MOD 30 MIN: CPT | Performed by: FAMILY MEDICINE

## 2021-01-27 NOTE — PATIENT INSTRUCTIONS
Obesity   AMBULATORY CARE:   Obesity  is when your body mass index (BMI) is greater than 30  Your healthcare provider will use your height and weight to measure your BMI  The risks of obesity include  many health problems, such as injuries or physical disability  You may need tests to check for the following:  · Diabetes    · High blood pressure or high cholesterol    · Heart disease    · Gallbladder or liver disease    · Cancer of the colon, breast, prostate, liver, or kidney    · Sleep apnea    · Arthritis or gout    Seek care immediately if:   · You have a severe headache, confusion, or difficulty speaking  · You have weakness on one side of your body  · You have chest pain, sweating, or shortness of breath  Contact your healthcare provider if:   · You have symptoms of gallbladder or liver disease, such as pain in your upper abdomen  · You have knee or hip pain and discomfort while walking  · You have symptoms of diabetes, such as intense hunger and thirst, and frequent urination  · You have symptoms of sleep apnea, such as snoring or daytime sleepiness  · You have questions or concerns about your condition or care  Treatment for obesity  focuses on helping you lose weight to improve your health  Even a small decrease in BMI can reduce the risk for many health problems  Your healthcare provider will help you set a weight-loss goal   · Lifestyle changes  are the first step in treating obesity  These include making healthy food choices and getting regular physical activity  Your healthcare provider may suggest a weight-loss program that involves coaching, education, and therapy  · Medicine  may help you lose weight when it is used with a healthy diet and physical activity  · Surgery  can help you lose weight if you are very obese and have other health problems  There are several types of weight-loss surgery  Ask your healthcare provider for more information      Be successful losing weight:   · Set small, realistic goals  An example of a small goal is to walk for 20 minutes 5 days a week  Anther goal is to lose 5% of your body weight  · Tell friends, family members, and coworkers about your goals  and ask for their support  Ask a friend to lose weight with you, or join a weight-loss support group  · Identify foods or triggers that may cause you to overeat , and find ways to avoid them  Remove tempting high-calorie foods from your home and workplace  Place a bowl of fresh fruit on your kitchen counter  If stress causes you to eat, then find other ways to cope with stress  · Keep a diary to track what you eat and drink  Also write down how many minutes of physical activity you do each day  Weigh yourself once a week and record it in your diary  Eating changes: You will need to eat 500 to 1,000 fewer calories each day than you currently eat to lose 1 to 2 pounds a week  The following changes will help you cut calories:  · Eat smaller portions  Use small plates, no larger than 9 inches in diameter  Fill your plate half full of fruits and vegetables  Measure your food using measuring cups until you know what a serving size looks like  · Eat 3 meals and 1 or 2 snacks each day  Plan your meals in advance  Zaida Becker and eat at home most of the time  Eat slowly  Do not skip meals  Skipping meals can lead to overeating later in the day  This can make it harder for you to lose weight  Talk with a dietitian to help you make a meal plan and schedule that is right for you  · Eat fruits and vegetables at every meal   They are low in calories and high in fiber, which makes you feel full  Do not add butter, margarine, or cream sauce to vegetables  Use herbs to season steamed vegetables  · Eat less fat and fewer fried foods  Eat more baked or grilled chicken and fish  These protein sources are lower in calories and fat than red meat  Limit fast food   Dress your salads with olive oil and vinegar instead of bottled dressing  · Limit the amount of sugar you eat  Do not drink sugary beverages  Limit alcohol  Activity changes:  Physical activity is good for your body in many ways  It helps you burn calories and build strong muscles  It decreases stress and depression, and improves your mood  It can also help you sleep better  Talk to your healthcare provider before you begin an exercise program   · Exercise for at least 30 minutes 5 days a week  Start slowly  Set aside time each day for physical activity that you enjoy and that is convenient for you  It is best to do both weight training and an activity that increases your heart rate, such as walking, bicycling, or swimming  · Find ways to be more active  Do yard work and housecleaning  Walk up the stairs instead of using elevators  Spend your leisure time going to events that require walking, such as outdoor festivals or fairs  This extra physical activity can help you lose weight and keep it off  Follow up with your healthcare provider as directed: You may need to meet with a dietitian  Write down your questions so you remember to ask them during your visits  © Copyright [address] Information is for End User's use only and may not be sold, redistributed or otherwise used for commercial purposes  All illustrations and images included in CareNotes® are the copyrighted property of A D A M , Inc  or [facility] Aries Coppola   The above information is an  only  It is not intended as medical advice for individual conditions or treatments  Talk to your doctor, nurse or pharmacist before following any medical regimen to see if it is safe and effective for you  [address]    Your healthcare provider and/or public health staff have evaluated you and have determined that you do not need to remain in the hospital at this time    At this time you can be isolated at home where you will be monitored by staff from your local or state health department  You should carefully follow the prevention and isolation steps below until a healthcare provider or local or state health department says that you can return to your normal activities  Stay home except to get medical care    People who are mildly ill with COVID-19 are able to isolate at home during their illness  You should restrict activities outside your home, except for getting medical care  Do not go to work, school, or public areas  Avoid using public transportation, ride-sharing, or taxis  Separate yourself from other people and animals in your home    People: As much as possible, you should stay in a specific room and away from other people in your home  Also, you should use a separate bathroom, if available  Animals: You should restrict contact with pets and other animals while you are sick with COVID-19, just like you would around other people  Although there have not been reports of pets or other animals becoming sick with COVID-19, it is still recommended that people sick with COVID-19 limit contact with animals until more information is known about the virus  When possible, have another member of your household care for your animals while you are sick  If you are sick with COVID-19, avoid contact with your pet, including petting, snuggling, being kissed or licked, and sharing food  If you must care for your pet or be around animals while you are sick, wash your hands before and after you interact with pets and wear a facemask  See COVID-19 and Animals for more information  Call ahead before visiting your doctor    If you have a medical appointment, call the healthcare provider and tell them that you have or may have COVID-19  This will help the healthcare providers office take steps to keep other people from getting infected or exposed      Wear a facemask    You should wear a facemask when you are around other people (e g , sharing a room or vehicle) or pets and before you enter a healthcare providers office  If you are not able to wear a facemask (for example, because it causes trouble breathing), then people who live with you should not stay in the same room with you, or they should wear a facemask if they enter your room  Cover your coughs and sneezes    Cover your mouth and nose with a tissue when you cough or sneeze  Throw used tissues in a lined trash can  Immediately wash your hands with soap and water for at least 20 seconds or, if soap and water are not available, clean your hands with an alcohol-based hand  that contains at least 60% alcohol  Clean your hands often    Wash your hands often with soap and water for at least 20 seconds, especially after blowing your nose, coughing, or sneezing; going to the bathroom; and before eating or preparing food  If soap and water are not readily available, use an alcohol-based hand  with at least 60% alcohol, covering all surfaces of your hands and rubbing them together until they feel dry  Soap and water are the best option if hands are visibly dirty  Avoid touching your eyes, nose, and mouth with unwashed hands  Avoid sharing personal household items    You should not share dishes, drinking glasses, cups, eating utensils, towels, or bedding with other people or pets in your home  After using these items, they should be washed thoroughly with soap and water  Clean all high-touch surfaces everyday    High touch surfaces include counters, tabletops, doorknobs, bathroom fixtures, toilets, phones, keyboards, tablets, and bedside tables  Also, clean any surfaces that may have blood, stool, or body fluids on them  Use a household cleaning spray or wipe, according to the label instructions   Labels contain instructions for safe and effective use of the cleaning product including precautions you should take when applying the product, such as wearing gloves and making sure you have good ventilation during use of the product  Monitor your symptoms    Seek prompt medical attention if your illness is worsening (e g , difficulty breathing)  Before seeking care, call your healthcare provider and tell them that you have, or are being evaluated for, COVID-19  Put on a facemask before you enter the facility  These steps will help the healthcare providers office to keep other people in the office or waiting room from getting infected or exposed  Ask your healthcare provider to call the local or [facility] health department  Persons who are placed under active monitoring or facilitated self-monitoring should follow instructions provided by their local health department or occupational health professionals, as appropriate  If you have a medical emergency and need to call 911, notify the dispatch personnel that you have, or are being evaluated for COVID-19  If possible, put on a facemask before emergency medical services arrive  Discontinuing home isolation    Patients with confirmed COVID-19 should remain under home isolation precautions until the following conditions are met:   - They have had no fever for at least 24 hours (that is one full day of no fever without the use medicine that reduces fevers)  AND  - other symptoms have improved (for example, when their cough or shortness of breath have improved)  AND  - If had mild or moderate illness, at least 10 days have passed since their symptoms first appeared or if severe illness (needed oxygen) or immunosuppressed, at least 20 days have passed since symptoms first appeared  Patients with confirmed COVID-19 should also notify close contacts (including their workplace) and ask that they self-quarantine  Currently, close contact is defined as being within 6 feet for 15 minutes or more from the period 24 hours starting 48 hours before symptom onset to the time at which the patient went into isolation    Close contacts of patients diagnosed with COVID-19 should be instructed by the patient to self-quarantine for 14 days from the last time of their last contact with the patient  Source: [employer]    COVID TESTING TRIAGE:    Other Screening: (Travel, Work, School, etc  with no known direct exposure to [address]) - Refer to [employer] ([facility], [facility], [employer]) Call ahead to see if these locations provide these services  In an effort to best serve our community in this time of increased COVID activity, [facility] will only be providing COVID testing for those individuals who are symptomatic or have had a direct exposure to a COVID case  Unfortunately, due to resource constraints we are unable to provide testing for asymptomatic individuals who need a swab for clearance to travel, or return to work or school  Please note that [facility] Employee's in need of testing for return to work in a Network position can continue to obtain testing through this hotline  Thank you in advance for your understanding and cooperation during these challenging times  Symptomatic Patient or Exposed Patient(Close Contact with COVID + Person):  Testing Sites:   Centralized Testing [facility]: [place], [place]  oHours: Monday-Friday 8:00A - 5:00P  [address] (Specialty Pavilion)oAddress: [address] [facility] [place], [place] [address]RUKBFH: Monday-Friday 8:00A - 5:00P  [facility]: [address] [facility] , [place], [place] [id number]: Monday-Friday 8:00A - 5:00P   [facility])oAddress: [address] [address]OAKVRL: Monday-Friday 8:00A - 5:00P   [address] [facility]oAddress: [address], [place] [address]U Hours: Monday-Friday 8:00A - 5:00P   [facility]Address: [address], [place], [place] [address]aMnajr: Monday-Friday 8:00A - 5:00P & Saturday 8:00A - 1:00P  [address]: [address] [place]  [address](located in the parking lot behind the building)oHours: Monday-Friday 8:00A - 1:00P   [facility] oAddress: [address] [facility], [place], [place]  [address]fCkcfn: Monday-Friday 8:00A - 5:00P   [facility] Now Sites:  [address] Now [address] [facility]) oAddress: [address], [facility], [facility], [address]: Monday-Friday 7:30A - 10:30P & Sat/Sun 8:00A - 8:00P   [address]  [facility] (Critical access hospital)oAddress: [address] [place] [address]jImsdf: Monday-Friday 7:30A - 10:30P & Sat/Sun 8A - 8P  [address] Now- [place]Address: [address] [place] [address]zSxihm: Monday-Friday 8:00A - 8:00P & Sat/Sun 8:00A - 4:00P  [address] ([address] [facility],6Th Floor Center)oAddress: [address], [address]: Monday-Friday 8:00A - 8:00P & Sat/Sun 8:00A - 4:00P  [address] Now- Rob oAddress: [address] [facility]) [place]: Monday-Friday 8:00A - 8:00P  [address] Now-[place]soAddress: [address] [facility] [place], [place] [address]VWUJWM: Monday-Friday 8:00A-8:00P & Sat/Sun 8:00A - 4:00P  [address] Now- [place]Address: [address] [facility] [address]xZnhss: Monday-Friday 8:00A - 8:00P & Sat/Sun 8:00A - 4:00P  [address], [place], [place] [address]QPHMRQ: Monday-Friday 8:00A - 8:00P  [address] Now- [place]oAddress: [place], [place] [address]eNrsma: 7 days a week 8:00A - 8:00P   [address] Now- [place]Address: [address] [facility] [address], [place], [place] [address]zJvtbh: Monday-Friday 8:00A - 8:00P  [address] Now- [facility]: [address] [facility] [place] [address]vGtbzq: Monday-Friday 8:00A - 8:00P & Sat/Sun 8:00A - 4:00P   [facility] Now- [facility] Radha (Outpatient Center)oAddress: [address] [place], [place] [address]CVDLRN: Monday-Friday 8:00A - 8:00P  [address] Now- [place]Address: [address] [address]YGEIOL: Monday-Friday 8:00A - 8:00P & Sat/Sun 8:00A - 4:00PSt  [facility] Now- [facility] HospitalnoAddress: [address] [place], [place] [address]IVSXLU: 7 days a week 8:00A - 8:00P   [address] Now[facility] Medical CenterloAddress: [address], [address] [address]CHBABM: 7 days a week 8:00A - 8:00P  [address], [place], [place] [address]HMLZLR: Monday-Friday 7:30A - 10:30P & Sat/Sun 8:00A - 8:00P

## 2021-01-27 NOTE — PROGRESS NOTES
COVID-19 Virtual Visit     Assessment/Plan:    Problem List Items Addressed This Visit     None      Visit Diagnoses     Exposure to COVID-19 virus    -  Primary    Relevant Orders    Novel Coronavirus (Covid-19),PCR [facility] - Collected at [facility]    Viral infection, unspecified        Relevant Orders    Novel Coronavirus (Covid-19),PCR [facility] - Collected at [facility] or [facility]    BMI 34 0-34 9,adult             Disposition:     I referred patient to one of our centralized sites for a COVID-19 swab  Pending labs  Patient is to remain in quarantine until further notice  If her COVID-19 test is negative, due to being symptomatic and exposed, she will need to complete a 14 day quarantine through Monday, 2/8/21, ending on Tuesday, 2/9/21  If her COVID-19 test is positive, she will need to complete a 10 day isolation through Friday, 2/5/21, at their earliest ending on Saturday, 2/6/21  Use Albuterol HFA PRN  I have spent 25 minutes directly with the patient  Greater than 50% of this time was spent in counseling/coordination of care regarding: prognosis, instructions for management, patient and family education, risk factor reductions and impressions  Encounter provider Damaris Roman DO    Provider located at [address] [place] [id number] [phone]    Recent Visits  No visits were found meeting these conditions  Showing recent visits within past 7 days and meeting all other requirements     Today's Visits  Date Type Provider Dept   01/27/21 Telemedicine Farida Coronado DO [facility] today's visits and meeting all other requirements     Future Appointments  No visits were found meeting these conditions     Showing future appointments within next 150 days and meeting all other requirements      This virtual check-in was done via [employer] and patient was informed that this is a secure, HIPAA-compliant platform  She agrees to proceed  Patient agrees to participate in a virtual check in via telephone or video visit instead of presenting to the office to address urgent/immediate medical needs  Patient is aware this is a billable service  After connecting through [facility], the patient was identified by name and date of birth  Anil Wei was informed that this was a telemedicine visit and that the exam was being conducted confidentially over secure lines  My office door was closed  No one else was in the room  Anil Wei acknowledged consent and understanding of privacy and security of the telemedicine visit  I informed the patient that I have reviewed her record in Epic and presented the opportunity for her to ask any questions regarding the visit today  The patient agreed to participate  Subjective:   Anil Wei is a 52 y o  female who is concerned about COVID-19  Patient's symptoms include chills, nasal congestion, cough (Dry), chest tightness and headache  Patient denies fever, fatigue, rhinorrhea, sore throat, anosmia, loss of taste, shortness of breath, abdominal pain, nausea, vomiting, diarrhea and myalgias       Date of symptom onset: 1/26/2021  Date of exposure: 1/25/2021    Exposure:   Contact with a person who is under investigation (PUI) for or who is positive for COVID-19 within the last 14 days?: Yes    Hospitalized recently for fever and/or lower respiratory symptoms?: No      Currently a healthcare worker that is involved in direct patient care?: No      Works in a special setting where the risk of COVID-19 transmission may be high? (this may include long-term care, correctional and group home facilities; homeless shelters; assisted-living facilities and group homes ): No      Resident in a special setting where the risk of COVID-19 transmission may be high? (this may include long-term care, correctional and group home facilities; homeless shelters; assisted-living facilities and group homes ): No      Daughter tested COVID positive on Tuesday, 1/26/21 (symptomatic Sunday, 1/24/21)  Daughter's boyfriend COVID positive on Monday, 1/25/21 (symptomatic Sunday, 1/24/21)  Patient was at their house Saturday, 1/23/21 - Monday, 1/25/21  Son, daughter-in-law, 2 grandsons (9yo and 5mo) - asymptomatic, not tested yet  Patient has been quarantining in her room since Tuesday, 1/26/21  Not using Albuterol HFA PRN  Using Tylenol 1 tab and Ibuprofen 1 tab c benefit        Lab Results   Component Value Date    SARSCOV2 Not Detected 09/30/2020     Past Medical History:   Diagnosis Date    Anxiety     Asthma     Heavy menses     Irregular menses     Knee injury     Left knee     Palpitations     PONV (postoperative nausea and vomiting)     Seasonal allergies     Shortness of breath     "sometimes"    Trigger finger of right thumb     Wears glasses      Past Surgical History:   Procedure Laterality Date    OTHER SURGICAL HISTORY      Ablation of uterus    MI CONIZATION CERVIX,LOOP ELECTRD N/A 12/6/2016    Procedure: BIOPSY LEEP CERVIX;  Surgeon: Tuan Alejandro DO;  Location: [facility];  Service: Gynecology    MI HYSTEROSCOPY,W/ENDO BX N/A 12/6/2016    Procedure: DILATATION AND CURETTAGE (D&C) WITH HYSTEROSCOPY;  Surgeon: Tuan Alejandro DO;  Location[facility];  Service: Gynecology    MI HYSTEROSCOPY,W/ENDOMETRIAL ABLATION N/A 12/6/2016    Procedure: ABLATION ENDOMETRIAL Jeanette Limber;  Surgeon: Tuan Alejandro DO;  Location: [facility];  Service: Gynecology    TUBAL LIGATION       Current Outpatient Medications   Medication Sig Dispense Refill    albuterol (PROVENTIL HFA) 90 mcg/act inhaler Inhale 2 puffs every 6 (six) hours as needed for shortness of breath 1 Inhaler 1    cholecalciferol (VITAMIN D3) 1,000 units tablet Take 2,000 Units by mouth daily      meloxicam (MOBIC) 15 mg tablet Take 1 tablet (15 mg total) by mouth daily 30 tablet 1    Multiple Vitamin (MULTIVITAMIN) tablet Take 1 tablet by mouth daily      omeprazole (PriLOSEC) 20 mg delayed release capsule Take 1 capsule (20 mg total) by mouth daily 30 capsule 5     No current facility-administered medications for this visit  Allergies   Allergen Reactions    Codeine Hives and GI Intolerance       Review of Systems   Constitutional: Positive for chills  Negative for fatigue and fever  HENT: Positive for congestion  Negative for rhinorrhea and sore throat  Respiratory: Positive for cough (Dry) and chest tightness  Negative for shortness of breath  Gastrointestinal: Negative for abdominal pain, diarrhea, nausea and vomiting  Musculoskeletal: Negative for myalgias  Neurological: Positive for headaches  Objective:    Vitals:    01/27/21 1341   Temp: 98 8 °F (37 1 °C)   TempSrc: Temporal   Weight: 86 2 kg (190 lb)   Height: 5' 2" (1 575 m)       Physical Exam  Vitals signs and nursing note reviewed  Constitutional:       General: She is not in acute distress  Appearance: Normal appearance  She is well-developed  She is obese  HENT:      Head: Normocephalic and atraumatic  Right Ear: External ear normal       Left Ear: External ear normal       Nose: Nose normal       Mouth/Throat:      Mouth: Mucous membranes are moist       Pharynx: Oropharynx is clear  Eyes:      Extraocular Movements: Extraocular movements intact  Conjunctiva/sclera: Conjunctivae normal    Neck:      Musculoskeletal: Neck supple  Cardiovascular:      Rate and Rhythm: Normal rate and regular rhythm  Pulmonary:      Effort: Pulmonary effort is normal  No respiratory distress  Musculoskeletal:         General: No swelling  Right lower leg: No edema  Left lower leg: No edema  Skin:     General: Skin is warm and dry  Neurological:      General: No focal deficit present  Mental Status: She is alert and oriented to person, place, and time     Psychiatric:         Mood and Affect: Mood normal        VIRTUAL VISIT DISCLAIMER    Rommel Smith acknowledges that she has consented to an online visit or consultation  She understands that the online visit is based solely on information provided by her, and that, in the absence of a face-to-face physical evaluation by the physician, the diagnosis she receives is both limited and provisional in terms of accuracy and completeness  This is not intended to replace a full medical face-to-face evaluation by the physician  Rommel Moore understands and accepts these terms  BMI Counseling: Body mass index is 34 75 kg/m²  The BMI is above normal  Nutrition recommendations include 3-5 servings of fruits/vegetables daily  Exercise recommendations include exercising 3-5 times per week

## 2021-01-28 LAB — SARS-COV-2 RNA RESP QL NAA+PROBE: NEGATIVE

## 2021-01-28 NOTE — RESULT ENCOUNTER NOTE
As her COVID-19 test is negative, due to being symptomatic and exposed, she will need to complete a 14 day quarantine through Monday, 2/8/21, ending on Tuesday, 2/9/21  Message sent to patient via [employer] patient portal   Nurse to also call patient

## 2021-01-29 RX ORDER — OMEPRAZOLE 20 MG/1
20 CAPSULE, DELAYED RELEASE ORAL DAILY
Qty: 30 CAPSULE | Refills: 5 | Status: SHIPPED | OUTPATIENT
Start: 2021-01-29

## 2021-02-04 ENCOUNTER — VBI (OUTPATIENT)
Dept: ADMINISTRATIVE | Facility: OTHER | Age: 51
End: 2021-02-04

## 2021-06-22 ENCOUNTER — OFFICE VISIT (OUTPATIENT)
Dept: FAMILY MEDICINE CLINIC | Facility: CLINIC | Age: 51
End: 2021-06-22
Payer: COMMERCIAL

## 2021-06-22 VITALS
WEIGHT: 193.6 LBS | TEMPERATURE: 97.5 F | HEART RATE: 84 BPM | BODY MASS INDEX: 35.63 KG/M2 | HEIGHT: 62 IN | OXYGEN SATURATION: 99 % | DIASTOLIC BLOOD PRESSURE: 98 MMHG | RESPIRATION RATE: 14 BRPM | SYSTOLIC BLOOD PRESSURE: 140 MMHG

## 2021-06-22 DIAGNOSIS — Z12.31 VISIT FOR SCREENING MAMMOGRAM: ICD-10-CM

## 2021-06-22 DIAGNOSIS — R53.83 FATIGUE, UNSPECIFIED TYPE: ICD-10-CM

## 2021-06-22 DIAGNOSIS — Z11.59 NEED FOR HEPATITIS C SCREENING TEST: ICD-10-CM

## 2021-06-22 DIAGNOSIS — M25.50 MULTIPLE JOINT PAIN: Primary | ICD-10-CM

## 2021-06-22 DIAGNOSIS — Z12.4 CERVICAL CANCER SCREENING: ICD-10-CM

## 2021-06-22 DIAGNOSIS — Z13.220 SCREENING FOR LIPOID DISORDERS: ICD-10-CM

## 2021-06-22 DIAGNOSIS — F41.1 ANXIETY STATE: ICD-10-CM

## 2021-06-22 PROCEDURE — 3725F SCREEN DEPRESSION PERFORMED: CPT | Performed by: FAMILY MEDICINE

## 2021-06-22 PROCEDURE — 99214 OFFICE O/P EST MOD 30 MIN: CPT | Performed by: FAMILY MEDICINE

## 2021-06-22 PROCEDURE — 3008F BODY MASS INDEX DOCD: CPT | Performed by: FAMILY MEDICINE

## 2021-06-22 PROCEDURE — 4004F PT TOBACCO SCREEN RCVD TLK: CPT | Performed by: FAMILY MEDICINE

## 2021-06-22 RX ORDER — SERTRALINE HYDROCHLORIDE 25 MG/1
TABLET, FILM COATED ORAL
Qty: 60 TABLET | Refills: 3 | Status: SHIPPED | OUTPATIENT
Start: 2021-06-22

## 2021-06-22 NOTE — PROGRESS NOTES
Assessment/Plan:    No problem-specific Assessment & Plan notes found for this encounter  Diagnoses and all orders for this visit:    Multiple joint pain  Comments:  recheck labs  likely OA, moving less, mood overlay  may benefit from aquatherapy  f/u with ortho  Orders:  -     RF Screen w/ Reflex to Titer; Future  -     C-reactive protein; Future  -     GENIA Screen w/ Reflex to Titer/Pattern; Future  -     Sedimentation rate, automated; Future    Anxiety state  Comments:  discussed options  anxious and depressed with feeds into pain issues  start zoloft daily  f/u 1 mo  Orders:  -     sertraline (ZOLOFT) 25 mg tablet; 1/2 tab daily x 1 week, then 1 tab daily x 1 week, then 1 1/2 tabs daily x 1 week, then 2 tabs daily    Fatigue, unspecified type  Comments:  labs as ordered  Orders:  -     CBC and differential; Future  -     Comprehensive metabolic panel; Future  -     TSH, 3rd generation; Future  -     Vitamin B12; Future  -     Folate; Future  -     Vitamin D 25 hydroxy; Future    Screening for lipoid disorders  -     Lipid panel; Future    Visit for screening mammogram  -     Mammo screening bilateral w 3d & cad; Future    Need for hepatitis C screening test  -     Hepatitis C antibody; Future    Cervical cancer screening  -     Ambulatory referral to Obstetrics / Gynecology; Future    Other orders  -     Cancel: PNEUMOCOCCAL POLYSACCHARIDE VACCINE 23-VALENT =>3YO SQ IM        Subjective:      Patient ID: Jose Byrd is a 54 y o  female  HPI   Pt presents with multiple concerns  Pt's knees are painful and swollen most of the time  She saw ortho last year, but she didn't respond to visco supplementation and hasn't been able to f/u given pandemic  Notes she feels unsteady and unstable on knees and worries constantly about falling  Pt also c/o pain in joints in hands and elbows  No swelling there  No redness  No family hx of inflammatory arthritis  Negative labs last year    mobic marginally helpful    The inability to move and do what she wants to do causes worsening mood  Admits to depression, anxiety symptoms  No si/hi  Doesn't have the energy to do a lot due to fatigue and pain  Avoids people/activities  The following portions of the patient's history were reviewed and updated as appropriate: allergies, current medications, past family history, past medical history, past social history, past surgical history and problem list     Review of Systems   Constitutional: Positive for fatigue  Negative for chills, fever and unexpected weight change  HENT: Negative for congestion, ear pain, hearing loss, postnasal drip, rhinorrhea, sinus pressure, sinus pain, sore throat, trouble swallowing and voice change  Eyes: Negative for pain, redness and visual disturbance  Respiratory: Negative for cough and shortness of breath  Cardiovascular: Negative for chest pain, palpitations and leg swelling  Gastrointestinal: Negative for abdominal pain, constipation, diarrhea and nausea  Endocrine: Negative for cold intolerance, heat intolerance, polydipsia and polyuria  Genitourinary: Negative for dysuria, frequency and urgency  Musculoskeletal: Positive for arthralgias  Negative for joint swelling and myalgias  Skin: Negative for rash  No suspicious lesions   Neurological: Negative for weakness, numbness and headaches  Hematological: Negative for adenopathy  Psychiatric/Behavioral: Positive for dysphoric mood and sleep disturbance  Negative for suicidal ideas  The patient is nervous/anxious  Objective:      /98   Pulse 84   Temp 97 5 °F (36 4 °C)   Resp 14   Ht 5' 2" (1 575 m)   Wt 87 8 kg (193 lb 9 6 oz)   SpO2 99%   BMI 35 41 kg/m²          Physical Exam  Constitutional:       General: She is not in acute distress  Appearance: She is well-developed  HENT:      Head: Normocephalic and atraumatic        Right Ear: Tympanic membrane, ear canal and external ear normal       Left Ear: Tympanic membrane, ear canal and external ear normal       Nose: Nose normal       Mouth/Throat:      Pharynx: No oropharyngeal exudate  Eyes:      Conjunctiva/sclera: Conjunctivae normal       Pupils: Pupils are equal, round, and reactive to light  Neck:      Thyroid: No thyromegaly  Vascular: No carotid bruit or JVD  Cardiovascular:      Rate and Rhythm: Regular rhythm  Heart sounds: S1 normal and S2 normal  No murmur heard  No friction rub  No gallop  No S3 or S4 sounds  Pulmonary:      Effort: Pulmonary effort is normal       Breath sounds: Normal breath sounds  No wheezing, rhonchi or rales  Abdominal:      General: Bowel sounds are normal  There is no distension  Palpations: Abdomen is soft  Tenderness: There is no abdominal tenderness  Lymphadenopathy:      Cervical: No cervical adenopathy  Neurological:      Mental Status: She is alert and oriented to person, place, and time  Cranial Nerves: No cranial nerve deficit  Sensory: No sensory deficit  Motor: No weakness, tremor or pronator drift  Coordination: Romberg sign negative  Coordination normal  Finger-Nose-Finger Test normal  Rapid alternating movements normal       Gait: Gait normal       Deep Tendon Reflexes: Reflexes are normal and symmetric  Reflexes normal    Psychiatric:         Attention and Perception: Attention normal          Mood and Affect: Mood is anxious and depressed  Affect is not inappropriate  Speech: Speech normal          Behavior: Behavior normal          Thought Content:  Thought content normal          Cognition and Memory: Cognition normal          Judgment: Judgment normal

## 2021-06-22 NOTE — PATIENT INSTRUCTIONS
Obtain labs  Obtain covid shot  F/u 1 mo  Start zoloft 25mg tabs: 1/2 tab daily x 1 week, then 1 tab daily x 1 week, then 1 1/2 tabs daily x 1 week, then 2 tabs daily  Mammogram/gyn

## 2021-08-23 ENCOUNTER — LAB (OUTPATIENT)
Dept: LAB | Facility: CLINIC | Age: 51
End: 2021-08-23
Payer: COMMERCIAL

## 2021-08-23 DIAGNOSIS — Z13.220 SCREENING FOR LIPOID DISORDERS: ICD-10-CM

## 2021-08-23 DIAGNOSIS — R79.89 ABNORMAL TSH: ICD-10-CM

## 2021-08-23 DIAGNOSIS — Z11.59 NEED FOR HEPATITIS C SCREENING TEST: ICD-10-CM

## 2021-08-23 DIAGNOSIS — R53.83 FATIGUE, UNSPECIFIED TYPE: ICD-10-CM

## 2021-08-23 DIAGNOSIS — M25.50 MULTIPLE JOINT PAIN: ICD-10-CM

## 2021-08-23 LAB
25(OH)D3 SERPL-MCNC: 39.3 NG/ML (ref 30–100)
ALBUMIN SERPL BCP-MCNC: 4 G/DL (ref 3.5–5)
ALP SERPL-CCNC: 78 U/L (ref 46–116)
ALT SERPL W P-5'-P-CCNC: 27 U/L (ref 12–78)
ANION GAP SERPL CALCULATED.3IONS-SCNC: 9 MMOL/L (ref 4–13)
AST SERPL W P-5'-P-CCNC: 15 U/L (ref 5–45)
BASOPHILS # BLD AUTO: 0.07 THOUSANDS/ΜL (ref 0–0.1)
BASOPHILS NFR BLD AUTO: 1 % (ref 0–1)
BILIRUB SERPL-MCNC: 0.47 MG/DL (ref 0.2–1)
BUN SERPL-MCNC: 15 MG/DL (ref 5–25)
CALCIUM SERPL-MCNC: 9.2 MG/DL (ref 8.3–10.1)
CHLORIDE SERPL-SCNC: 107 MMOL/L (ref 100–108)
CHOLEST SERPL-MCNC: 242 MG/DL (ref 50–200)
CO2 SERPL-SCNC: 26 MMOL/L (ref 21–32)
CREAT SERPL-MCNC: 0.92 MG/DL (ref 0.6–1.3)
CRP SERPL QL: 6.6 MG/L
EOSINOPHIL # BLD AUTO: 0.41 THOUSAND/ΜL (ref 0–0.61)
EOSINOPHIL NFR BLD AUTO: 6 % (ref 0–6)
ERYTHROCYTE [DISTWIDTH] IN BLOOD BY AUTOMATED COUNT: 13.4 % (ref 11.6–15.1)
ERYTHROCYTE [SEDIMENTATION RATE] IN BLOOD: 9 MM/HOUR (ref 0–19)
FOLATE SERPL-MCNC: >20 NG/ML (ref 3.1–17.5)
GFR SERPL CREATININE-BSD FRML MDRD: 73 ML/MIN/1.73SQ M
GLUCOSE P FAST SERPL-MCNC: 108 MG/DL (ref 65–99)
HCT VFR BLD AUTO: 43.4 % (ref 34.8–46.1)
HCV AB SER QL: NORMAL
HDLC SERPL-MCNC: 60 MG/DL
HGB BLD-MCNC: 14.5 G/DL (ref 11.5–15.4)
IMM GRANULOCYTES # BLD AUTO: 0.02 THOUSAND/UL (ref 0–0.2)
IMM GRANULOCYTES NFR BLD AUTO: 0 % (ref 0–2)
LDLC SERPL CALC-MCNC: 151 MG/DL (ref 0–100)
LYMPHOCYTES # BLD AUTO: 2.72 THOUSANDS/ΜL (ref 0.6–4.47)
LYMPHOCYTES NFR BLD AUTO: 40 % (ref 14–44)
MCH RBC QN AUTO: 31.4 PG (ref 26.8–34.3)
MCHC RBC AUTO-ENTMCNC: 33.4 G/DL (ref 31.4–37.4)
MCV RBC AUTO: 94 FL (ref 82–98)
MONOCYTES # BLD AUTO: 0.61 THOUSAND/ΜL (ref 0.17–1.22)
MONOCYTES NFR BLD AUTO: 9 % (ref 4–12)
NEUTROPHILS # BLD AUTO: 2.94 THOUSANDS/ΜL (ref 1.85–7.62)
NEUTS SEG NFR BLD AUTO: 44 % (ref 43–75)
NONHDLC SERPL-MCNC: 182 MG/DL
NRBC BLD AUTO-RTO: 0 /100 WBCS
PLATELET # BLD AUTO: 365 THOUSANDS/UL (ref 149–390)
PMV BLD AUTO: 9.6 FL (ref 8.9–12.7)
POTASSIUM SERPL-SCNC: 4.1 MMOL/L (ref 3.5–5.3)
PROT SERPL-MCNC: 7.3 G/DL (ref 6.4–8.2)
RBC # BLD AUTO: 4.62 MILLION/UL (ref 3.81–5.12)
RHEUMATOID FACT SER QL LA: NEGATIVE
SODIUM SERPL-SCNC: 142 MMOL/L (ref 136–145)
TRIGL SERPL-MCNC: 154 MG/DL
TSH SERPL DL<=0.05 MIU/L-ACNC: 4.57 UIU/ML (ref 0.36–3.74)
VIT B12 SERPL-MCNC: 550 PG/ML (ref 100–900)
WBC # BLD AUTO: 6.77 THOUSAND/UL (ref 4.31–10.16)

## 2021-08-23 PROCEDURE — 82306 VITAMIN D 25 HYDROXY: CPT

## 2021-08-23 PROCEDURE — 80061 LIPID PANEL: CPT

## 2021-08-23 PROCEDURE — 85025 COMPLETE CBC W/AUTO DIFF WBC: CPT

## 2021-08-23 PROCEDURE — 86803 HEPATITIS C AB TEST: CPT

## 2021-08-23 PROCEDURE — 36415 COLL VENOUS BLD VENIPUNCTURE: CPT

## 2021-08-23 PROCEDURE — 86430 RHEUMATOID FACTOR TEST QUAL: CPT

## 2021-08-23 PROCEDURE — 85652 RBC SED RATE AUTOMATED: CPT

## 2021-08-23 PROCEDURE — 84439 ASSAY OF FREE THYROXINE: CPT

## 2021-08-23 PROCEDURE — 86038 ANTINUCLEAR ANTIBODIES: CPT

## 2021-08-23 PROCEDURE — 82746 ASSAY OF FOLIC ACID SERUM: CPT

## 2021-08-23 PROCEDURE — 82607 VITAMIN B-12: CPT

## 2021-08-23 PROCEDURE — 84443 ASSAY THYROID STIM HORMONE: CPT

## 2021-08-23 PROCEDURE — 80053 COMPREHEN METABOLIC PANEL: CPT

## 2021-08-23 PROCEDURE — 86140 C-REACTIVE PROTEIN: CPT

## 2021-08-24 LAB — T4 FREE SERPL-MCNC: 1.02 NG/DL (ref 0.76–1.46)

## 2021-08-25 ENCOUNTER — TELEPHONE (OUTPATIENT)
Dept: FAMILY MEDICINE CLINIC | Facility: CLINIC | Age: 51
End: 2021-08-25

## 2021-08-25 ENCOUNTER — TELEMEDICINE (OUTPATIENT)
Dept: FAMILY MEDICINE CLINIC | Facility: CLINIC | Age: 51
End: 2021-08-25
Payer: COMMERCIAL

## 2021-08-25 VITALS — WEIGHT: 198 LBS | BODY MASS INDEX: 36.44 KG/M2 | HEIGHT: 62 IN

## 2021-08-25 DIAGNOSIS — B34.9 VIRAL INFECTION, UNSPECIFIED: Primary | ICD-10-CM

## 2021-08-25 LAB — RYE IGE QN: NEGATIVE

## 2021-08-25 PROCEDURE — 99214 OFFICE O/P EST MOD 30 MIN: CPT | Performed by: FAMILY MEDICINE

## 2021-08-25 PROCEDURE — U0005 INFEC AGEN DETEC AMPLI PROBE: HCPCS | Performed by: FAMILY MEDICINE

## 2021-08-25 PROCEDURE — U0003 INFECTIOUS AGENT DETECTION BY NUCLEIC ACID (DNA OR RNA); SEVERE ACUTE RESPIRATORY SYNDROME CORONAVIRUS 2 (SARS-COV-2) (CORONAVIRUS DISEASE [COVID-19]), AMPLIFIED PROBE TECHNIQUE, MAKING USE OF HIGH THROUGHPUT TECHNOLOGIES AS DESCRIBED BY CMS-2020-01-R: HCPCS | Performed by: FAMILY MEDICINE

## 2021-08-25 NOTE — PATIENT INSTRUCTIONS
[address]    Your healthcare provider and/or public health staff have evaluated you and have determined that you do not need to remain in the hospital at this time  At this time you can be isolated at home where you will be monitored by staff from your local or state health department  You should carefully follow the prevention and isolation steps below until a healthcare provider or local or state health department says that you can return to your normal activities  Stay home except to get medical care    People who are mildly ill with COVID-19 are able to isolate at home during their illness  You should restrict activities outside your home, except for getting medical care  Do not go to work, school, or public areas  Avoid using public transportation, ride-sharing, or taxis  Separate yourself from other people and animals in your home    People: As much as possible, you should stay in a specific room and away from other people in your home  Also, you should use a separate bathroom, if available  Animals: You should restrict contact with pets and other animals while you are sick with COVID-19, just like you would around other people  Although there have not been reports of pets or other animals becoming sick with COVID-19, it is still recommended that people sick with COVID-19 limit contact with animals until more information is known about the virus  When possible, have another member of your household care for your animals while you are sick  If you are sick with COVID-19, avoid contact with your pet, including petting, snuggling, being kissed or licked, and sharing food  If you must care for your pet or be around animals while you are sick, wash your hands before and after you interact with pets and wear a facemask  See COVID-19 and Animals for more information      Call ahead before visiting your doctor    If you have a medical appointment, call the healthcare provider and tell them that you have or may have COVID-19  This will help the healthcare providers office take steps to keep other people from getting infected or exposed  Wear a facemask    You should wear a facemask when you are around other people (e g , sharing a room or vehicle) or pets and before you enter a healthcare providers office  If you are not able to wear a facemask (for example, because it causes trouble breathing), then people who live with you should not stay in the same room with you, or they should wear a facemask if they enter your room  Cover your coughs and sneezes    Cover your mouth and nose with a tissue when you cough or sneeze  Throw used tissues in a lined trash can  Immediately wash your hands with soap and water for at least 20 seconds or, if soap and water are not available, clean your hands with an alcohol-based hand  that contains at least 60% alcohol  Clean your hands often    Wash your hands often with soap and water for at least 20 seconds, especially after blowing your nose, coughing, or sneezing; going to the bathroom; and before eating or preparing food  If soap and water are not readily available, use an alcohol-based hand  with at least 60% alcohol, covering all surfaces of your hands and rubbing them together until they feel dry  Soap and water are the best option if hands are visibly dirty  Avoid touching your eyes, nose, and mouth with unwashed hands  Avoid sharing personal household items    You should not share dishes, drinking glasses, cups, eating utensils, towels, or bedding with other people or pets in your home  After using these items, they should be washed thoroughly with soap and water  Clean all high-touch surfaces everyday    High touch surfaces include counters, tabletops, doorknobs, bathroom fixtures, toilets, phones, keyboards, tablets, and bedside tables  Also, clean any surfaces that may have blood, stool, or body fluids on them   Use a household cleaning spray or wipe, according to the label instructions  Labels contain instructions for safe and effective use of the cleaning product including precautions you should take when applying the product, such as wearing gloves and making sure you have good ventilation during use of the product  Monitor your symptoms    Seek prompt medical attention if your illness is worsening (e g , difficulty breathing)  Before seeking care, call your healthcare provider and tell them that you have, or are being evaluated for, COVID-19  Put on a facemask before you enter the facility  These steps will help the healthcare providers office to keep other people in the office or waiting room from getting infected or exposed  Ask your healthcare provider to call the local or [facility] health department  Persons who are placed under active monitoring or facilitated self-monitoring should follow instructions provided by their local health department or occupational health professionals, as appropriate  If you have a medical emergency and need to call 911, notify the dispatch personnel that you have, or are being evaluated for COVID-19  If possible, put on a facemask before emergency medical services arrive      Discontinuing home isolation    Patients with confirmed COVID-19 should remain under home isolation precautions until the following conditions are met:   - They have had no fever for at least 24 hours (that is one full day of no fever without the use medicine that reduces fevers)  AND  - other symptoms have improved (for example, when their cough or shortness of breath have improved)  AND  - If had mild or moderate illness, at least 10 days have passed since their symptoms first appeared or if severe illness (needed oxygen) or immunosuppressed, at least 20 days have passed since symptoms first appeared  Patients with confirmed COVID-19 should also notify close contacts (including their workplace) and ask that they self-quarantine  Currently, close contact is defined as being within 6 feet for 15 minutes or more from the period 24 hours starting 48 hours before symptom onset to the time at which the patient went into isolation  Close contacts of patients diagnosed with COVID-19 should be instructed by the patient to self-quarantine for 14 days from the last time of their last contact with the patient  Source: [employer]      COVID TESTING TRIAGE:    Other Screening: (Travel, Work, School, etc  with no known direct exposure to [address]) - Refer to [employer] ([facility], [facility], [employer]) Call ahead to see if these locations provide these services  In an effort to best serve our community in this time of increased COVID activity, [facility] will only be providing COVID testing for those individuals who are symptomatic or have had a direct exposure to a COVID case  Unfortunately, due to resource constraints we are unable to provide testing for asymptomatic individuals who need a swab for clearance to travel, or return to work or school  Please note that [facility] Employee's in need of testing for return to work in a Network position can continue to obtain testing through this hotline  Thank you in advance for your understanding and cooperation during these challenging times  Symptomatic Patient or Exposed Patient(Close Contact with COVID + Person):  Testing Sites:   Centralized Testing [facility]Address: [place] [address] [place] [address]  oHours: Monday-Friday 8:00A - 5:00P  [address] ([facility]ty [facility])oAddress: [address] [place], [place] [address]ALCPGT: Monday-Friday 8:00A - 5:00P   [address] [facility])oAddress: [address] [facility], [place] [address]BYKVXM: Monday-Friday 8:00A - 5:00P  [address] CenteroAddress: [address] [place], [place] [address]p Hours: Monday-Friday 8:00A - 5:00P   [facility]Address: [address], [place], [place] [address]BEYFVH: Monday-Friday 8:00A - 5:00P & Saturday 8:00A - 1:00P   [facility]  [employer] oAddress: [address] [place]  [address](located in the parking lot behind the building)oHours: Monday-Friday 8:00A - 1:00P   [address] [place] [address] oAddress: [address] [facility], [place], [place]  [address]ePnurg: Monday-Friday 10:00A - 2:00P   Care Now Sites:  [address]  Now [address] [facility]) oAddress: [address], [address]  [facility] [address], [facility], [address]: Monday-Friday 7:30A - 10:30P & Sat/Sun 8:00A - 8:00P   [facility] [place] ([facility])oAddress: [address] [place] [address]kMxetw: Monday-Friday 7:30A - 10:30P & Sat/Sun 8A - 8P  [address] Now- [place]oAddress: [address] [facility] [place] [address]vMuvvc: Monday-Friday 8:00A - 8:00P & Sat/Sun 8:00A - 4:00P  [address] ([address] [facility])oAddress: [address]: Monday-Friday 8:00A - 8:00P & Sat/Sun 8:00A - 4:00P  [address] Now- Rob oAddress: [address]) Igor Carraport: Monday-Friday 8:00A - 8:00P  Elinore Denier [facility] Now-[place]soAddress: [address] [facility] [address] [place], [place] [address]VGLWYU: Monday-Friday 8:00A-8:00P & Sat/Sun 8:00A - 4:00P  [address] Now- [place]goAddress: [address] [facility] [address]aRfwwj: Monday-Friday 8:00A - 8:00P & Sat/Sun 8:00A - 4:00P  [address], [place], [place] [address]GRMBHG: Monday-Friday 8:00A - 8:00P  [address] Now- [facility]Address: [place], [place] [address]yHwzre: 7 days a week 8:00A - 8:00P    [address] [facility] Now- [place]oAddress: [address], [place], [place] [address]hDkism: Monday-Friday 8:00A - 8:00P  [address] Now- [facility]Address: [address] [place], [place] [address]pHxtjz: Monday-Friday 8:00A - 8:00P & Sat/Sun 8:00A - 4:00P   [facility])oAddress: [address] [facility] [address] [place], [place] [address]HWDXMU: Monday-Friday 8:00A - 8:00P  [address] Now- [place]goAddress: [address] [place], [place] [address]AICMFM: Monday-Friday 8:00A - 8:00P & Sat/Sun 8:00A - 4:00PSt  [facility] Now- [place]oAddress: [address] [place], [place] [address]HZJJMD: 7 days a week 8:00A - 8:00P    [facility]- White[place]oAddress: [address], [place] [address]CLFIIY: 7 days a week 8:00A - 8:00P   [address] [employer] Now- [place]eroAddress: [address], [place], [place] [address]mRocxf: Monday-Friday 7:30A - 10:30P & Sat/Sun 8:00A - 8:00P      Bamlanivimab and etesevimab are investigational medicines used to treat mild to moderate symptoms of COVID-19 in non-hospitalized adults and adolescents (15years of age and older who weigh at least 80 pounds (40 kg)), and who are at high risk for developing severe COVID-19 symptoms or the need for hospitalization  Bamlanivimab and etesevimab are investigational because they are still being studied  There is limited information known about the safety and effectiveness of using bamlanivimab and etesevimab to treat people with COVID-19  The FDA has authorized the emergency use of bamlanivimab and etesevimab for the treatment of COVID-19 under an Emergency Use Authorization (EUA)  How will I receive bamlanivimab and etesevimab?  Bamlanivimab and etesevimab are given at the same time through a vein (intravenous or IV)   You will receive one dose of bamlanivimab and etesevimab by IV infusion  The infusion will take 21-60 minutes or longer  Possible side effects of bamlanivimab and etesevimab: Allergic reactions can happen during and after infusion with bamlanivimab and etesevimab      Possible reactions include: fever, chills, nausea, headache, shortness of breath, low or high blood pressure, rapid or slow heart rate, chest discomfort or pain, weakness, confusion, feeling tired, wheezing, swelling of your lips, face, or throat, rash including hives, itching, muscle aches, dizziness, and sweating  These reactions may be severe or life threatening  Worsening symptoms after treatment: You may experience new or worsening symptoms after infusion, including fever, difficulty breathing, rapid or slow heart rate, tiredness, weakness or confusion  If these occur, contact your healthcare provider or seek immediate medical attention as some of these events have required hospitalization  It is unknown if these events are related to treatment or are due to the progression of COVID19  The side effects of getting any medicine by vein may include brief pain, bleeding, bruising of the skin, soreness, swelling, and possible infection at the infusion site  These are not all the possible side effects of bamlanivimab and etesevimab  Not a lot of people have been given bamlanivimab and etesevimab  Serious and unexpected side effects may happen  Bamlanivimab and etesevimab are still being studied so it is possible that all of the risks are not known at this time  It is possible that bamlanivimab and etesevimab could interfere with your body's own ability to fight off a future infection of SARS-CoV-2  Similarly, bamlanivimab and etesevimab may reduce your bodys immune response to a vaccine for SARS-CoV-2  Specific studies have not been conducted to address these possible risks  Talk to your healthcare provider if you have any questions  Emergency Use Authorization:    The [facility] FDA has made bamlanivimab and etesevimab available under an emergency access mechanism called an EUA   The EUA is supported by a  of Health and Human Service (HHS) declaration that circumstances exist to justify the emergency use of drugs and biological products during the COVID-19 pandemic  Bamlanivimab and etesevimab have not undergone the same type of review as an FDA-approved or cleared product  The FDA may issue an EUA when certain criteria are met, which includes that there are no adequate, approved, and available alternatives  In addition, the FDA decision is based on the totality of scientific evidence available showing that it is reasonable to believe that the product meets certain criteria for safety, performance, and labeling and may be effective in treatment of patients during the COVID-19 pandemic  All of these criteria must be met to allow for the product to be used in the treatment of patients during the COVID-19 pandemic  The EUA for bamlanivimab and etesevimab together is in effect for the duration of the COVID-19 declaration justifying emergency use of these products, unless terminated or revoked (after which the product may no longer be used)  What if I am pregnant or breastfeeding? There is limited experience treating pregnant women or breastfeeding mothers with bamlanivimab and etesevimab  For a mother and unborn baby, the benefit of receiving bamlanivimab and etesevimab may be greater than the risk from the treatment  If you are pregnant or breastfeeding, discuss your options and specific situation with your healthcare provider  Regarding COVID-19 Vaccination:    Currently there is no data or safety or efficacy of COVID-19 vaccination in persons who received monoclonal antibodies as part of COVID-19 treatment  Treatment should be deferred for at least 90 days to avoid interference of the treatment with vaccine-induced immune responses (this is based on estimated half-life of therapies and evidence suggesting reinfection is uncommon within 90 days of initial infection)      Full fact sheet document for patients can be found at: [employer] pt    The patient consents to proceed with bamlanivimab and etesevimab infusion  Possible side effects of bamlanivimab are: Allergic reactions   Allergic reactions can happen during and after infusion with bamlanivimab which include:    fever, chills, nausea, headache, shortness of breath, low blood pressure, wheezing, swelling of your lips, face, or  throat, rash including hives, itching, muscle aches, and dizziness  The side effects of getting any medicine by vein may include brief pain, bleeding, bruising of the skin, soreness,  swelling, and possible infection at the infusion site  These are not all the possible side effects of bamlanivimab  Not a lot of people have been given bamlanivimab  Serious and unexpected side effects may happen  Jose M Whelan is still being studied so it is possible that all of  the risks are not known at this time  Please note that this drug was approved under the Emergency Use Authorization   of the FDA and has not gone through the full, formal FDA approval process    It is possible that bamlanivimab could interfere with your body's own ability to fight off a future infection of  SARS-CoV-2  Similarly, bamlanivimab may reduce your bodys immune response to a vaccine for SARS-CoV-2  Specific studies have not been conducted to address these possible risks  Currently there is no data or safety or efficacy of COVID-19 vaccination in persons who received monoclonal antibodies   as part of COVID-19 treatment  Treatment should be deferred for at least 90 days to avoid interference of the treatment   with vaccine-induced immune responses (this is based on estimated half-life of therapies and evidence suggesting reinfection   is uncommon within 90 days of initial infection)  The patient consents to proceed with bamlanivimab infusion      Table 2: Treatment-emergent Adverse Events Reported in at Least 1% of All  Subjects in BLAZE-1*         Bamlinivimab  Symptom Placebo 700 mg (N=101) 2000 mg (N=107) 7000 mg (N=101) Total (N=309)   Nausea 4% 3% 4% 5% 4%   Diarrhea 5% 1% 2% 7% 3%   Dizziness 2% 3% 3% 3% 3%   Headache 2% 3% 2% 0% 2%   Pruritis 1% 2% 3% 0% 2%   Vomiting 3% 1% 3% 1% 2%     *http://[employer]/eua/bamlanivimab-eua-factsheet-hcp  pdf    Instructions for Bamlanivimab infusion at [facility]    1  Once you arrive at [facility], please park in the small parking lot at the [address] entrance  Do not park in the main parking lot or in the parking garage  2  When you arrive, please call the infusion nurse at [phone] to be escorted into the clinic  3  It will be approximately a 4-hour visit  4  No visitors can accompany you into the clinic  If you need a , they will need to wait in the car until treatment is over or we can call them 30 minutes before you will be ready for   5  Please bring something to occupy your time for 4 hours, i e  book, i-Pad, phone, headphones, etc   6  Please make sure you bring your mask  You must wear a mask for the duration of your treatment  7  The clinic will have light refreshments and snacks available

## 2021-08-25 NOTE — PROGRESS NOTES
COVID-19 Outpatient Progress Note    Assessment/Plan:    Problem List Items Addressed This Visit     None      Visit Diagnoses     Viral infection, unspecified    -  Primary    Relevant Orders    Novel Coronavirus (Covid-19),PCR [facility] - Collected at Mobile [facility] or [facility]         Disposition:     I referred patient to one of our centralized sites for a COVID-19 swab  Pending labs  Patient should quarantine until further notice  If her COVID-19 test is negative, she can discontinue quarantine  If her COVID-19 test is positive, she should continue a 10 day isolation through Wednesday, 09/01/2021, at the earliest ending on Thursday, 09/02/2021    Monoclonal antibody infusion therapy was briefly discussed and information was sent to patient in the portal   If she is interested in this therapy, she will need further counseling  Patient declines antiemetic medication  She will continue to use her albuterol inhaler as needed  Advise Tia Neve med sinus rinse kit, Mucinex, Claritin/Zyrtec/Allegra  Avoid decongestants if you have high blood pressure  Motrin/Tylenol PRN  If her COVID-19 test is negative she plans to receive her COVID vaccine this Friday 08/27/2021  I have spent 22 minutes directly with the patient  Greater than 50% of this time was spent in counseling/coordination of care regarding: diagnostic results, prognosis, risks and benefits of treatment options, instructions for management, patient and family education, importance of treatment compliance, risk factor reductions and impressions  Verification of patient location:    Patient is located in the following state in which I hold an active license [place]    Encounter provider Nava Gregg DO    Provider located at [address] [place] [id number] [phone]    Recent Visits  No visits were found meeting these conditions    Showing recent visits within past 7 days and meeting all other requirements  Today's Visits  Date Type Provider Dept   08/25/21 Telemedicine DO Eldon Laird   08/25/21 Telephone [facility] Delatorre Pg   Showing today's visits and meeting all other requirements  Future Appointments  No visits were found meeting these conditions  Showing future appointments within next 150 days and meeting all other requirements     This virtual check-in was done via [facility] and patient was informed that this is a secure, HIPAA-compliant platform  She agrees to proceed  Patient agrees to participate in a virtual check in via telephone or video visit instead of presenting to the office to address urgent/immediate medical needs  Patient is aware this is a billable service  After connecting through [facility], the patient was identified by name and date of birth  Sumit Khan was informed that this was a telemedicine visit and that the exam was being conducted confidentially over secure lines  My office door was closed  No one else was in the room  Sumit Khan acknowledged consent and understanding of privacy and security of the telemedicine visit  I informed the patient that I have reviewed her record in Epic and presented the opportunity for her to ask any questions regarding the visit today  The patient agreed to participate  Subjective:   Sumit Khan is a 54 y o  female who is concerned about COVID-19  Patient's symptoms include chills, fatigue, nasal congestion, rhinorrhea, sore throat, cough, nausea, diarrhea (x 0 today and yesterday) and myalgias  Patient denies fever, anosmia, loss of taste, shortness of breath, chest tightness, abdominal pain, vomiting and headaches       Date of symptom onset: 8/22/2021  COVID-19 vaccination status: Not vaccinated    Exposure:   Contact with a person who is under investigation (PUI) for or who is positive for COVID-19 within the last 14 days?: No    Hospitalized recently for fever and/or lower respiratory symptoms?: No      Currently a healthcare worker that is involved in direct patient care?: No      Works in a special setting where the risk of COVID-19 transmission may be high? (this may include long-term care, correctional and snf facilities; homeless shelters; assisted-living facilities and group homes ): No      Resident in a special setting where the risk of COVID-19 transmission may be high? (this may include long-term care, correctional and snf facilities; homeless shelters; assisted-living facilities and group homes ): No      +Heart racing, dizziness  She feels anxious due to heart racing  HR 91 on iPhone  Occurs at rest, lasting 1-3 minutes, self-resolves  Dizziness occurs when standing up quickly or rolling around in her bed, lasts less than 2 months  She is eating and drinking regularly  Using Dayquil and Nyquil which helped with rhinorrhea and nasal congestion  Drinking ginger ale  Last used Albuterol 2 days ago  She stopped Zoloft after 3 days early 7/21 due to frequent diarrhea        Lab Results   Component Value Date    SARSCOV2 Negative 01/27/2021    SARSCOV2 Not Detected 09/30/2020     Past Medical History:   Diagnosis Date    Anxiety     Asthma     Heavy menses     Irregular menses     Knee injury     Left knee     Palpitations     PONV (postoperative nausea and vomiting)     Seasonal allergies     Shortness of breath     "sometimes"    Trigger finger of right thumb     Wears glasses      Past Surgical History:   Procedure Laterality Date    OTHER SURGICAL HISTORY      Ablation of uterus    LA CONIZATION CERVIX,LOOP ELECTRD N/A 12/6/2016    Procedure: BIOPSY LEEP CERVIX;  Surgeon: Ronny Brady DO;  [facility];  Service: Gynecology    LA HYSTEROSCOPY,W/ENDO BX N/A 12/6/2016    Procedure: DILATATION AND CURETTAGE (D&C) WITH HYSTEROSCOPY;  Surgeon: Ronny Brady DO;  [facility];  Service: Gynecology  IA HYSTEROSCOPY,W/ENDOMETRIAL ABLATION N/A 12/6/2016    Procedure: ABLATION ENDOMETRIAL Derpat Fetsalud;  Surgeon: Raquel Garcia DO;  [facility];  Service: Gynecology    TUBAL LIGATION       Current Outpatient Medications   Medication Sig Dispense Refill    albuterol (PROVENTIL HFA) 90 mcg/act inhaler Inhale 2 puffs every 6 (six) hours as needed for shortness of breath 1 Inhaler 1    cholecalciferol (VITAMIN D3) 1,000 units tablet Take 2,000 Units by mouth daily      Multiple Vitamin (MULTIVITAMIN) tablet Take 1 tablet by mouth daily      omeprazole (PriLOSEC) 20 mg delayed release capsule Take 1 capsule (20 mg total) by mouth daily 30 capsule 5    meloxicam (MOBIC) 15 mg tablet Take 1 tablet (15 mg total) by mouth daily (Patient not taking: Reported on 8/25/2021) 30 tablet 1    sertraline (ZOLOFT) 25 mg tablet 1/2 tab daily x 1 week, then 1 tab daily x 1 week, then 1 1/2 tabs daily x 1 week, then 2 tabs daily (Patient not taking: Reported on 8/25/2021) 60 tablet 3     No current facility-administered medications for this visit  Allergies   Allergen Reactions    Codeine Hives and GI Intolerance    Pollen Extract Sneezing       Review of Systems   Constitutional: Positive for chills and fatigue  Negative for fever  HENT: Positive for congestion, rhinorrhea and sore throat  Respiratory: Positive for cough  Negative for chest tightness and shortness of breath  Gastrointestinal: Positive for diarrhea (x 0 today and yesterday) and nausea  Negative for abdominal pain and vomiting  Musculoskeletal: Positive for myalgias  Neurological: Negative for headaches  Objective:    Vitals:    08/25/21 1346   Weight: 89 8 kg (198 lb)   Height: 5' 2" (1 575 m)       Physical Exam  Vitals and nursing note reviewed  Constitutional:       General: She is not in acute distress  Appearance: She is well-developed  She is obese  HENT:      Head: Normocephalic and atraumatic        Right Ear: External ear normal       Left Ear: External ear normal       Nose: Nose normal       Mouth/Throat:      Mouth: Mucous membranes are dry  Pharynx: Oropharynx is clear  No oropharyngeal exudate or posterior oropharyngeal erythema  Eyes:      Extraocular Movements: Extraocular movements intact  Conjunctiva/sclera: Conjunctivae normal    Pulmonary:      Effort: Pulmonary effort is normal  No respiratory distress  Breath sounds: Normal breath sounds  Abdominal:      Palpations: Abdomen is soft  Tenderness: There is no abdominal tenderness  There is no guarding or rebound  Musculoskeletal:         General: No swelling  Cervical back: Normal range of motion  Right lower leg: No edema  Left lower leg: No edema  Skin:     General: Skin is warm and dry  Neurological:      General: No focal deficit present  Mental Status: She is alert and oriented to person, place, and time  Psychiatric:         Mood and Affect: Mood normal          VIRTUAL VISIT [address] [place] [address] verbally agrees to participate in [employer]  Pt is aware that [employer] could be limited without vital signs or the ability to perform a full hands-on physical Young Ariel understands she or the provider may request at any time to terminate the video visit and request the patient to seek care or treatment in person

## 2021-08-25 NOTE — TELEPHONE ENCOUNTER
PATIENT C/O DIZZINESS, NAUSEA, SORE THROAT, INCREASED HEART RATE  PATIENT DENIED SOB, CHEST PAIN, FEVER, NUMBNESS/TINGLING  Triaged using Telephone Triage Protocols for Nurse, Fifth Edition, by Jona Snyder  Triaged via the [address] [place] [address] category, page  320 Outcome: VIRTUAL VISIT   Patient called with the following Covid-19 concern:    Patient was exposed to Covid-19? NO  Date of Exposure? N/A    Date of last exposure (family member in quarantine)       Patient has the following symptoms: DIZZINESS, NAUSEA, SORE THROAT, INCREASED HEART RATE  Date symptoms started: Stewart NIGHT     Is the patient a hospital employee? NO  Has patient been vaccinated? NO   If so, when was their last COVID vaccine? N/A     Will route the following message as HIGH priority to a provider currently in the office for review

## 2021-08-27 ENCOUNTER — TELEPHONE (OUTPATIENT)
Dept: FAMILY MEDICINE CLINIC | Facility: CLINIC | Age: 51
End: 2021-08-27

## 2021-08-27 ENCOUNTER — TELEMEDICINE (OUTPATIENT)
Dept: FAMILY MEDICINE CLINIC | Facility: CLINIC | Age: 51
End: 2021-08-27
Payer: COMMERCIAL

## 2021-08-27 VITALS — BODY MASS INDEX: 36.44 KG/M2 | HEIGHT: 62 IN | WEIGHT: 198 LBS

## 2021-08-27 DIAGNOSIS — R05.8 DRY COUGH: Primary | ICD-10-CM

## 2021-08-27 PROCEDURE — 4004F PT TOBACCO SCREEN RCVD TLK: CPT | Performed by: FAMILY MEDICINE

## 2021-08-27 PROCEDURE — 3008F BODY MASS INDEX DOCD: CPT | Performed by: FAMILY MEDICINE

## 2021-08-27 PROCEDURE — 99214 OFFICE O/P EST MOD 30 MIN: CPT | Performed by: FAMILY MEDICINE

## 2021-08-27 RX ORDER — BENZONATATE 200 MG/1
200 CAPSULE ORAL 3 TIMES DAILY PRN
Qty: 30 CAPSULE | Refills: 0 | Status: SHIPPED | OUTPATIENT
Start: 2021-08-27

## 2021-08-27 NOTE — TELEPHONE ENCOUNTER
Called patient to schedule her follow up for mood and labs, and while on phone she mentioned she now has a cough and sore throat has worsened  She is aware of negative Covid results

## 2021-08-27 NOTE — RESULT ENCOUNTER NOTE
Pt is overdue for an appointment with Dr Porfirio Navarro for mood, they can also review these labs  Non-urgent - in next month fine  Nothing worrisome on the labs

## 2021-08-27 NOTE — PROGRESS NOTES
Virtual Regular Visit    Assessment/Plan:     Problem List Items Addressed This Visit     None      Visit Diagnoses     Dry cough    -  Primary    Relevant Medications    benzonatate (TESSALON) 200 MG capsule      pt COVID negative  Likely viral illness   Rest  Fluids  Tessalon perles  Use albuterol inhaler three times a day  Call if persists or worsens  No follow-ups on file  Reason for visit is   Chief Complaint   Patient presents with    Cough    Sore Throat     not getting any better      Encounter provider Alexia Schofield DO  Provider located at [address] [place] [address] [address]  [address] [facility] Narcisaconstance Alejandre 1159  [phone]    Recent Visits  Date Type Provider Dept   08/25/21 Prabha 19, DO Eldon Ariella Neymar   08/25/21 Telephone [facility] Ravi Pg   Showing recent visits within past 7 days and meeting all other requirements  Today's Visits  Date Type Provider Dept   08/27/21 Narcisa Valencia 587, DO Eldon Aguila   08/27/21 Telephone DO Eldon Del Cid   Showing today's visits and meeting all other requirements  Future Appointments  No visits were found meeting these conditions  Showing future appointments within next 150 days and meeting all other requirements       The patient was identified by name and date of birth  Amaya Aquino was informed that this is a telemedicine visit and that the visit is being conducted through [facility] and patient was informed that this is a secure, HIPAA-compliant platform  She agrees to proceed     My office door was closed  No one else was in the room  She acknowledged consent and understanding of privacy and security of the video platform  The patient has agreed to participate and understands they can discontinue the visit at any time      Patient is currently located in the state [facility]  Patient is currently located in a state in which I am licensed    Patient is aware this is a billable service  Angélica Pena is a 54 y o  female is being seen via Video Visit today due to the COVID-19 pandemic    Chief Complaint   Patient presents with    Cough    Sore Throat     not getting any better        Today's concerns are:    Started 5 days ago   All of a sudden started with sore throat and dizziness and did not feel well   Had felt weak also at that time   Little better today   Not as lethargic  Now dry cough irritating throat   Dx with asthma since she was a kid   Uses albuterol as needed - used it on Monday   Last needed steroids 10 years ago   Chills - hot and cold   Nausea   No diarrhea  Did not get COVID vaccine yet (planned to today)   No wheezing or tightness in chest   dayquil and nyquil   She was also having bad anxiety - thinks that is why she was having anxiety     Vitals:    08/27/21 1247   Weight: 89 8 kg (198 lb)   Height: 5' 2" (1 575 m)     Wt Readings from Last 3 Encounters:   08/27/21 89 8 kg (198 lb)   08/25/21 89 8 kg (198 lb)   06/22/21 87 8 kg (193 lb 9 6 oz)     BP Readings from Last 3 Encounters:   06/22/21 140/98   12/15/20 (!) 159/103   10/27/20 (!) 177/118       PHQ-9 Depression Screening    PHQ-9:   Frequency of the following problems over the past two weeks:              Past Medical History:   Diagnosis Date    Anxiety     Asthma     Heavy menses     Irregular menses     Knee injury     Left knee     Palpitations     PONV (postoperative nausea and vomiting)     Seasonal allergies     Shortness of breath     "sometimes"    Trigger finger of right thumb     Wears glasses      Past Surgical History:   Procedure Laterality Date    OTHER SURGICAL HISTORY      Ablation of uterus    KS CONIZATION CERVIX,LOOP ELECTRD N/A 12/6/2016    Procedure: BIOPSY LEEP CERVIX;  Surgeon: Doug Carlson DO;  [facility];  Service: Gynecology    KS HYSTEROSCOPY,W/ENDO BX N/A 12/6/2016    Procedure: DILATATION AND CURETTAGE (D&C) WITH HYSTEROSCOPY; Surgeon: Prudence Fernández DO;  [facility];  Service: Gynecology    NM HYSTEROSCOPY,W/ENDOMETRIAL ABLATION N/A 12/6/2016    Procedure: ABLATION ENDOMETRIAL Brandon Her;  Surgeon: Prudence Fernández DO;  [facility];  Service: Gynecology    TUBAL LIGATION         Current Medications:  Current Outpatient Medications   Medication Sig Dispense Refill    albuterol (PROVENTIL HFA) 90 mcg/act inhaler Inhale 2 puffs every 6 (six) hours as needed for shortness of breath 1 Inhaler 1    cholecalciferol (VITAMIN D3) 1,000 units tablet Take 2,000 Units by mouth daily      meloxicam (MOBIC) 15 mg tablet Take 1 tablet (15 mg total) by mouth daily 30 tablet 1    Multiple Vitamin (MULTIVITAMIN) tablet Take 1 tablet by mouth daily      omeprazole (PriLOSEC) 20 mg delayed release capsule Take 1 capsule (20 mg total) by mouth daily 30 capsule 5    sertraline (ZOLOFT) 25 mg tablet 1/2 tab daily x 1 week, then 1 tab daily x 1 week, then 1 1/2 tabs daily x 1 week, then 2 tabs daily 60 tablet 3    benzonatate (TESSALON) 200 MG capsule Take 1 capsule (200 mg total) by mouth 3 (three) times a day as needed for cough 30 capsule 0     No current facility-administered medications for this visit  Allergies: Allergies   Allergen Reactions    Codeine Hives and GI Intolerance    Pollen Extract Sneezing       Review of Systems  all others negative - no chest pain, SOB, normal urine and bowels  no GERD  sleeping well  mood good  No wheezing  No fevers  Physical Exam   Video Exam Pt not examined in person - seen over FaceTime   Constitutional:  she appears well-developed and well-nourished  HENT: Head: Normocephalic  Right Ear: External ear normal    Left Ear: External ear normal    Nose: Nose normal    Eyes: Pupils are equal, round, and reactive to light  Right eye exhibits no discharge  Left eye exhibits no discharge  No scleral icterus  Neck: Normal range of motion     Pulmonary/Chest: Effort normal  No respiratory distress  Neurological: she is alert and oriented to person, place, and time  Skin: Skin is warm and dry on face - no rashes  Not pale  Not diaphoretic  Psychiatric: she  has a normal mood and affect  she behavior is normal  Thought content normal        As a result of this visit, I have not referred the patient for further respiratory evaluation  VIRTUAL VISIT [address] [place] [address] acknowledges that she has consented to an online visit or consultation  She understands that the online visit is based solely on information provided by her, and that, in the absence of a face-to-face physical evaluation by the physician, the diagnosis she receives is both limited and provisional in terms of accuracy and completeness  This is not intended to replace a full medical face-to-face evaluation by the physician  Hever Buenrostro understands and accepts these terms

## 2021-08-27 NOTE — LETTER
August 30, 2021     Patient: Amaya Aquino   YOB: 1970   Date of Visit: 8/27/2021       To Whom it May Concern: Leonor Bean is under my professional care  She was seen in my office on 8/27/2021  She may return to work on 09/01/2021  If you have any questions or concerns, please don't hesitate to call           Sincerely,          Alexia Schofield, DO

## 2021-09-21 ENCOUNTER — APPOINTMENT (OUTPATIENT)
Dept: LAB | Facility: CLINIC | Age: 51
End: 2021-09-21
Payer: COMMERCIAL

## 2021-09-21 DIAGNOSIS — R79.89 ABNORMAL TSH: ICD-10-CM

## 2021-09-21 PROCEDURE — 86376 MICROSOMAL ANTIBODY EACH: CPT

## 2021-09-21 PROCEDURE — 36415 COLL VENOUS BLD VENIPUNCTURE: CPT

## 2021-09-22 LAB — THYROPEROXIDASE AB SERPL-ACNC: <8 IU/ML (ref 0–34)

## 2021-09-28 ENCOUNTER — TELEPHONE (OUTPATIENT)
Dept: FAMILY MEDICINE CLINIC | Facility: CLINIC | Age: 51
End: 2021-09-28

## 2021-09-28 NOTE — TELEPHONE ENCOUNTER
Received call from [employer] [facility] looking for the forms that they had faxed to us  It appears something was faxed on 9/20, but not to [facility] (their forms are blank)  They need to be completed ASAP for pt's FMLA   The fax number is [phone]

## 2023-01-04 ENCOUNTER — APPOINTMENT (EMERGENCY)
Dept: RADIOLOGY | Facility: HOSPITAL | Age: 53
End: 2023-01-04

## 2023-01-04 ENCOUNTER — HOSPITAL ENCOUNTER (EMERGENCY)
Facility: HOSPITAL | Age: 53
Discharge: HOME | End: 2023-01-04
Attending: EMERGENCY MEDICINE

## 2023-01-04 ENCOUNTER — TELEPHONE (OUTPATIENT)
Dept: OTHER | Facility: OTHER | Age: 53
End: 2023-01-04

## 2023-01-04 VITALS
TEMPERATURE: 99.2 F | SYSTOLIC BLOOD PRESSURE: 116 MMHG | OXYGEN SATURATION: 97 % | DIASTOLIC BLOOD PRESSURE: 58 MMHG | RESPIRATION RATE: 20 BRPM | HEART RATE: 67 BPM

## 2023-01-04 DIAGNOSIS — R00.2 PALPITATIONS: Primary | ICD-10-CM

## 2023-01-04 DIAGNOSIS — R79.89 ELEVATED TSH: ICD-10-CM

## 2023-01-04 LAB
2HR DELTA HS TROPONIN: 0 NG/L
ANION GAP SERPL CALCULATED.3IONS-SCNC: 9 MMOL/L (ref 4–13)
ATRIAL RATE: 84 BPM
BASOPHILS # BLD AUTO: 0.09 THOUSANDS/ÂΜL (ref 0–0.1)
BASOPHILS NFR BLD AUTO: 1 % (ref 0–1)
BUN SERPL-MCNC: 14 MG/DL (ref 5–25)
CALCIUM SERPL-MCNC: 9.7 MG/DL (ref 8.4–10.2)
CARDIAC TROPONIN I PNL SERPL HS: 2 NG/L
CARDIAC TROPONIN I PNL SERPL HS: 2 NG/L
CHLORIDE SERPL-SCNC: 103 MMOL/L (ref 96–108)
CK SERPL-CCNC: 43 U/L (ref 26–192)
CO2 SERPL-SCNC: 25 MMOL/L (ref 21–32)
CREAT SERPL-MCNC: 0.7 MG/DL (ref 0.6–1.3)
D DIMER PPP FEU-MCNC: <0.27 UG/ML FEU
EOSINOPHIL # BLD AUTO: 0.36 THOUSAND/ÂΜL (ref 0–0.61)
EOSINOPHIL NFR BLD AUTO: 4 % (ref 0–6)
ERYTHROCYTE [DISTWIDTH] IN BLOOD BY AUTOMATED COUNT: 12.4 % (ref 11.6–15.1)
GFR SERPL CREATININE-BSD FRML MDRD: 101 ML/MIN/1.73SQ M
GLUCOSE SERPL-MCNC: 109 MG/DL (ref 65–140)
HCT VFR BLD AUTO: 43.4 % (ref 34.8–46.1)
HGB BLD-MCNC: 15.3 G/DL (ref 11.5–15.4)
IMM GRANULOCYTES # BLD AUTO: 0.03 THOUSAND/UL (ref 0–0.2)
IMM GRANULOCYTES NFR BLD AUTO: 0 % (ref 0–2)
LYMPHOCYTES # BLD AUTO: 2.73 THOUSANDS/ÂΜL (ref 0.6–4.47)
LYMPHOCYTES NFR BLD AUTO: 31 % (ref 14–44)
MAGNESIUM SERPL-MCNC: 1.8 MG/DL (ref 1.9–2.7)
MCH RBC QN AUTO: 33.8 PG (ref 26.8–34.3)
MCHC RBC AUTO-ENTMCNC: 35.3 G/DL (ref 31.4–37.4)
MCV RBC AUTO: 96 FL (ref 82–98)
MONOCYTES # BLD AUTO: 0.61 THOUSAND/ÂΜL (ref 0.17–1.22)
MONOCYTES NFR BLD AUTO: 7 % (ref 4–12)
NEUTROPHILS # BLD AUTO: 4.99 THOUSANDS/ÂΜL (ref 1.85–7.62)
NEUTS SEG NFR BLD AUTO: 57 % (ref 43–75)
NRBC BLD AUTO-RTO: 0 /100 WBCS
P AXIS: 59 DEGREES
PLATELET # BLD AUTO: 332 THOUSANDS/UL (ref 149–390)
PMV BLD AUTO: 9.5 FL (ref 8.9–12.7)
POTASSIUM SERPL-SCNC: 3.7 MMOL/L (ref 3.5–5.3)
PR INTERVAL: 158 MS
QRS AXIS: 34 DEGREES
QRSD INTERVAL: 76 MS
QT INTERVAL: 376 MS
QTC INTERVAL: 444 MS
RBC # BLD AUTO: 4.52 MILLION/UL (ref 3.81–5.12)
SODIUM SERPL-SCNC: 137 MMOL/L (ref 135–147)
T WAVE AXIS: 41 DEGREES
T4 FREE SERPL-MCNC: 1.17 NG/DL (ref 0.76–1.46)
TSH SERPL DL<=0.05 MIU/L-ACNC: 7.22 UIU/ML (ref 0.45–4.5)
VENTRICULAR RATE: 84 BPM
WBC # BLD AUTO: 8.81 THOUSAND/UL (ref 4.31–10.16)

## 2023-01-04 RX ORDER — LABETALOL HYDROCHLORIDE 5 MG/ML
10 INJECTION, SOLUTION INTRAVENOUS ONCE
Status: DISCONTINUED | OUTPATIENT
Start: 2023-01-04 | End: 2023-01-04 | Stop reason: HOSPADM

## 2023-01-04 NOTE — ED PROVIDER NOTES
History  Chief Complaint   Patient presents with   • Palpitations     Pt has palpitations, chest tightness, and L shoulder pain since 1900 yesterday  Feels this way when she gets anxious & takes a sertaline which usually helps but not today  Hx anxiety      Patient is a 51-year-old female with a history of anxiety is presenting to the emergency department for evaluation of palpitations  Patient reports that she currently has some Zoloft she takes as needed for anxiety  Sometimes at the end of day she will take half of a tablet to help calm her down enough to get some rest   She is currently a smoker and is trying to stop  She states last night she had an episode where she felt like her heart was racing  Initially she took half of Zoloft pill to help with her anxiety but this did not help  She tried to go to sleep but was woken up approximately 10 minutes later with the feeling like her heart was racing  She also felt some mild chest pressure at this time  She denies current feeling of palpitations or chest pressure  Denies shortness of breath, back pain, abdominal pain  She does endorse mild tingling in her b/l hands  Prior to Admission Medications   Prescriptions Last Dose Informant Patient Reported? Taking?    Multiple Vitamin (MULTIVITAMIN) tablet   Yes No   Sig: Take 1 tablet by mouth daily   albuterol (PROVENTIL HFA) 90 mcg/act inhaler   No No   Sig: Inhale 2 puffs every 6 (six) hours as needed for shortness of breath   benzonatate (TESSALON) 200 MG capsule   No No   Sig: Take 1 capsule (200 mg total) by mouth 3 (three) times a day as needed for cough   cholecalciferol (VITAMIN D3) 1,000 units tablet   Yes No   Sig: Take 2,000 Units by mouth daily   meloxicam (MOBIC) 15 mg tablet   No No   Sig: Take 1 tablet (15 mg total) by mouth daily   omeprazole (PriLOSEC) 20 mg delayed release capsule   No No   Sig: Take 1 capsule (20 mg total) by mouth daily   sertraline (ZOLOFT) 25 mg tablet   No No Si/2 tab daily x 1 week, then 1 tab daily x 1 week, then 1 1/2 tabs daily x 1 week, then 2 tabs daily      Facility-Administered Medications: None       Past Medical History:   Diagnosis Date   • Anxiety    • Asthma    • Heavy menses    • Irregular menses    • Knee injury     Left knee    • Palpitations    • PONV (postoperative nausea and vomiting)    • Seasonal allergies    • Shortness of breath     "sometimes"   • Trigger finger of right thumb    • Wears glasses        Past Surgical History:   Procedure Laterality Date   • OTHER SURGICAL HISTORY      Ablation of uterus   • OR CONIZATION CERVIX W/WO D&C RPR ELTRD EXC N/A 2016    Procedure: BIOPSY LEEP CERVIX;  Surgeon: Janna Melton DO;  Location: [facility];  Service: Gynecology   • OR HYSTEROSCOPY BX ENDOMETRIUM&/POLYPC W/WO D&C N/A 2016    Procedure: DILATATION AND CURETTAGE (D&C) WITH HYSTEROSCOPY;  Surgeon: Janna Melton DO;  Location[facility];  Service: Gynecology   • OR HYSTEROSCOPY ENDOMETRIAL ABLATION N/A 2016    Procedure: ABLATION ENDOMETRIAL Rachel Kabakim;  Surgeon: Janna Melton DO;  Location[facility];  Service: Gynecology   • TUBAL LIGATION         Family History   Problem Relation Age of Onset   • Hypertension Mother    • Stroke Father    • Hypertension Father    • No Known Problems Sister    • No Known Problems Brother    • No Known Problems Son    • No Known Problems Daughter    • Diabetes Maternal Grandmother    • Pneumonia Maternal Grandmother    • Cancer Maternal Uncle         Unknown type      I have reviewed and agree with the history as documented      E-Cigarette/Vaping   • E-Cigarette Use Never User      E-Cigarette/Vaping Substances   • Nicotine No    • THC No    • CBD No    • Flavoring No      Social History     Tobacco Use   • Smoking status: Every Day     Packs/day: 0 25     Years: 5 00     Pack years: 1 25     Types: Cigarettes     Start date: 2012   • Smokeless tobacco: Current   • Tobacco comments:     "would like to quit"   Vaping Use   • Vaping Use: Never used   Substance Use Topics   • Alcohol use: Yes     Alcohol/week: 2 0 standard drinks     Types: 1 Glasses of wine, 1 Cans of beer per week     Comment: socially    • Drug use: No        Review of Systems   Constitutional: Negative  HENT: Negative  Eyes: Negative  Respiratory: Positive for chest tightness  Cardiovascular: Positive for palpitations  Gastrointestinal: Negative  Endocrine: Negative  Genitourinary: Negative  Musculoskeletal: Negative  Skin: Negative  Allergic/Immunologic: Negative  Neurological: Negative  Hematological: Negative  Psychiatric/Behavioral: Negative  All other systems reviewed and are negative  Physical Exam  ED Triage Vitals   Temperature Pulse Respirations Blood Pressure SpO2   01/04/23 0225 01/04/23 0225 01/04/23 0225 01/04/23 0225 01/04/23 0225   99 2 °F (37 3 °C) 90 22 (!) 206/117 99 %      Temp Source Heart Rate Source Patient Position - Orthostatic VS BP Location FiO2 (%)   01/04/23 0225 01/04/23 0225 01/04/23 0225 01/04/23 0225 --   Oral Monitor Sitting Left arm       Pain Score       01/04/23 0600       No Pain             Orthostatic Vital Signs  Vitals:    01/04/23 0430 01/04/23 0500 01/04/23 0530 01/04/23 0600   BP: 156/82 150/63 156/75 116/58   Pulse: 74 68 78 67   Patient Position - Orthostatic VS:           Physical Exam  Vitals and nursing note reviewed  Constitutional:       General: She is not in acute distress  Appearance: Normal appearance  She is not ill-appearing, toxic-appearing or diaphoretic  HENT:      Head: Normocephalic and atraumatic  Eyes:      General: No scleral icterus  Right eye: No discharge  Left eye: No discharge  Extraocular Movements: Extraocular movements intact  Conjunctiva/sclera: Conjunctivae normal       Pupils: Pupils are equal, round, and reactive to light     Cardiovascular:      Rate and Rhythm: Normal rate  Pulses: Normal pulses  Heart sounds: Normal heart sounds  No murmur heard  No friction rub  No gallop  Pulmonary:      Effort: Pulmonary effort is normal  No respiratory distress  Breath sounds: Normal breath sounds  No stridor  No wheezing, rhonchi or rales  Abdominal:      General: Abdomen is flat  Bowel sounds are normal  There is no distension  Palpations: Abdomen is soft  Tenderness: There is no abdominal tenderness  There is no guarding or rebound  Musculoskeletal:         General: No swelling  Normal range of motion  Cervical back: Normal range of motion  No rigidity  Right lower leg: No edema  Left lower leg: No edema  Skin:     General: Skin is warm and dry  Capillary Refill: Capillary refill takes less than 2 seconds  Coloration: Skin is not jaundiced  Findings: No bruising or lesion  Neurological:      General: No focal deficit present  Mental Status: She is alert and oriented to person, place, and time  Mental status is at baseline  Psychiatric:         Mood and Affect: Mood is anxious  Behavior: Behavior normal          Thought Content:  Thought content normal          Judgment: Judgment normal          ED Medications  Medications - No data to display    Diagnostic Studies  Results Reviewed     Procedure Component Value Units Date/Time    T4, free [[id number]]  (Normal) Collected: 01/04/23 0311    Lab Status: Final result Specimen: Blood from Arm, Left Updated: 01/04/23 1018     Free T4 1 17 ng/dL     HS Troponin I 2hr [[id number]]  (Normal) Collected: 01/04/23 0534    Lab Status: Final result Specimen: Blood from Arm, Left Updated: 01/04/23 0614     hs TnI 2hr 2 ng/L      Delta 2hr hsTnI 0 ng/L     TSH, 3rd generation with Free T4 reflex [[id number]]  (Abnormal) Collected: 01/04/23 0311    Lab Status: Final result Specimen: Blood from Arm, Left Updated: 01/04/23 0420     TSH 3RD GENERATON 7 224 uIU/mL Narrative:      Patients undergoing fluorescein dye angiography may retain small amounts of fluorescein in the body for 48-72 hours post procedure  Samples containing fluorescein can produce falsely depressed TSH values  If the patient had this procedure,a specimen should be resubmitted post fluorescein clearance        Basic metabolic panel [[id number]] Collected: 01/04/23 0311    Lab Status: Final result Specimen: Blood from Arm, Left Updated: 01/04/23 0415     Sodium 137 mmol/L      Potassium 3 7 mmol/L      Chloride 103 mmol/L      CO2 25 mmol/L      ANION GAP 9 mmol/L      BUN 14 mg/dL      Creatinine 0 70 mg/dL      Glucose 109 mg/dL      Calcium 9 7 mg/dL      eGFR 101 ml/min/1 73sq m     Narrative:      [facility] guidelines for Chronic Kidney Disease (CKD):   •  Stage 1 with normal or high GFR (GFR > 90 mL/min/1 73 square meters)  •  Stage 2 Mild CKD (GFR = 60-89 mL/min/1 73 square meters)  •  Stage 3A Moderate CKD (GFR = 45-59 mL/min/1 73 square meters)  •  Stage 3B Moderate CKD (GFR = 30-44 mL/min/1 73 square meters)  •  Stage 4 Severe CKD (GFR = 15-29 mL/min/1 73 square meters)  •  Stage 5 End Stage CKD (GFR <15 mL/min/1 73 square meters)  Note: GFR calculation is accurate only with a steady state creatinine    CK Total with Reflex CKMB [[id number]]  (Normal) Collected: 01/04/23 0311    Lab Status: Final result Specimen: Blood from Arm, Left Updated: 01/04/23 0415     Total CK 43 U/L     Magnesium [[id number]]  (Abnormal) Collected: 01/04/23 0311    Lab Status: Final result Specimen: Blood from Arm, Left Updated: 01/04/23 0415     Magnesium 1 8 mg/dL     HS Troponin 0hr (reflex protocol) [[id number]]  (Normal) Collected: 01/04/23 0311    Lab Status: Final result Specimen: Blood from Arm, Left Updated: 01/04/23 0411     hs TnI 0hr 2 ng/L     D-dimer, quantitative [[id number]]  (Normal) Collected: 01/04/23 0311    Lab Status: Final result Specimen: Blood from Arm, Left Updated: 01/04/23 0403     D-Dimer, Quant <0 27 ug/ml FEU     Narrative: In the evaluation for possible pulmonary embolism, in the appropriate (Well's Score of 4 or less) patient, the age adjusted d-dimer cutoff for this patient can be calculated as:    Age x 0 01 (in ug/mL) for Age-adjusted D-dimer exclusion threshold for a patient over 50 years  CBC and differential [[id number]] Collected: 01/04/23 0311    Lab Status: Final result Specimen: Blood from Arm, Left Updated: 01/04/23 0324     WBC 8 81 Thousand/uL      RBC 4 52 Million/uL      Hemoglobin 15 3 g/dL      Hematocrit 43 4 %      MCV 96 fL      MCH 33 8 pg      MCHC 35 3 g/dL      RDW 12 4 %      MPV 9 5 fL      Platelets 707 Thousands/uL      nRBC 0 /100 WBCs      Neutrophils Relative 57 %      Immat GRANS % 0 %      Lymphocytes Relative 31 %      Monocytes Relative 7 %      Eosinophils Relative 4 %      Basophils Relative 1 %      Neutrophils Absolute 4 99 Thousands/µL      Immature Grans Absolute 0 03 Thousand/uL      Lymphocytes Absolute 2 73 Thousands/µL      Monocytes Absolute 0 61 Thousand/µL      Eosinophils Absolute 0 36 Thousand/µL      Basophils Absolute 0 09 Thousands/µL                  XR chest 2 views   ED Interpretation by Biju Adan DO (01/04 6093)   No infiltrate, effusion, pneumothorax      Final Result by Regina Messer MD (01/04 6442)      No acute cardiopulmonary disease                    Workstation performed: [id number]               Procedures  ECG 12 Lead Documentation Only    Date/Time: 1/4/2023 4:11 AM  Performed by: Biju Adan DO  Authorized by: Biju Adan DO     Indications / Diagnosis:  Palpitations  ECG reviewed by me, the ED Provider: yes    Patient location:  ED  Interpretation:     Interpretation: normal    Rate:     ECG rate:  84    ECG rate assessment: normal    Rhythm:     Rhythm: sinus rhythm    Ectopy:     Ectopy: none    QRS:     QRS axis:  Normal  Conduction:     Conduction: normal    ST segments:     ST segments:  Normal  T waves:     T waves: normal            ED Course             HEART Risk Score    Flowsheet Row Most Recent Value   Heart Score Risk Calculator    History 1 Filed at: 01/05/2023 0050   ECG 0 Filed at: 01/04/2023 0617   Age 1 Filed at: 01/04/2023 0617   Risk Factors 1 Filed at: 01/04/2023 0617   Troponin 0 Filed at: 01/04/2023 0617   HEART Score 3 Filed at: 01/04/2023 0617              PERC Rule for PE    Flowsheet Row Most Recent Value   PERC Rule for PE    Age >=50 0 Filed at: 01/05/2023 0050   HR >=100 0 Filed at: 01/05/2023 0050   O2 Sat on room air < 95% 0 Filed at: 01/05/2023 0050   History of PE or DVT 0 Filed at: 01/05/2023 0050   Recent trauma or surgery 0 Filed at: 01/05/2023 0050   Hemoptysis 0 Filed at: 01/05/2023 0050   Exogenous estrogen 0 Filed at: 01/05/2023 0050   Unilateral leg swelling 0 Filed at: 01/05/2023 0050   PERC Rule for PE Results 0 Filed at: 01/05/2023 0050                        Medical Decision Making  -Patient is a 50 old female presenting to emergency department for evaluation of palpitations   -Initial physical exam is largely unremarkable  EKG is normal sinus rhythm, no ectopic beats noted  -Laboratory evaluation is largely unremarkable apart from minimally elevated TSH  Given how minimally elevated his identity this is contributing to patient's palpitations   -Remainder of work-up in the emergency department is largely unremarkable  I do not believe there is any reason for further intervention  No life-threatening arrhythmias  Patient should see cardiology for evaluation plus or minus a Holter monitor  I expressed this to the patient and she verbalized understanding   -Patient discharged with instructions for follow-up    Elevated TSH: acute illness or injury  Palpitations: acute illness or injury  Amount and/or Complexity of Data Reviewed  Labs: ordered    Radiology: ordered and independent interpretation performed  Risk  Prescription drug management  Disposition  Final diagnoses:   Palpitations   Elevated TSH     Time reflects when diagnosis was documented in both MDM as applicable and the Disposition within this note     Time User Action Codes Description Comment    1/4/2023  6:18 AM Aliene Situ Add [R00 2] Palpitations     1/4/2023  6:18 AM Aliene Situ Add [R79 89] Elevated TSH       ED Disposition     ED Disposition   Discharge    Condition   Stable    Date/Time   Wed Jan 4, 2023  [address] discharge to home/self care  Follow-up Information     Follow up With Specialties Details Why Contact Info Additional [facility] Schedule an appointment as soon as possible for a visit in 2 days  [phone] [facility] [address] [place] [address], [address]    [facility],  Family Medicine In 2 days  [facility] [address]    [address]  [place] [address]  [phone]             Discharge Medication List as of 1/4/2023  6:19 AM      CONTINUE these medications which have NOT CHANGED    Details   albuterol (PROVENTIL HFA) 90 mcg/act inhaler Inhale 2 puffs every 6 (six) hours as needed for shortness of breath, Starting Mon 5/14/2018, Normal      benzonatate (TESSALON) 200 MG capsule Take 1 capsule (200 mg total) by mouth 3 (three) times a day as needed for cough, Starting Fri 8/27/2021, Normal      cholecalciferol (VITAMIN D3) 1,000 units tablet Take 2,000 Units by mouth daily, Historical Med      meloxicam (MOBIC) 15 mg tablet Take 1 tablet (15 mg total) by mouth daily, Starting Tue 12/15/2020, Normal      Multiple Vitamin (MULTIVITAMIN) tablet Take 1 tablet by mouth daily, Until Discontinued, Historical Med      omeprazole (PriLOSEC) 20 mg delayed release capsule Take 1 capsule (20 mg total) by mouth daily, Starting Fri 1/29/2021, Normal      sertraline (ZOLOFT) 25 mg tablet 1/2 tab daily x 1 week, then 1 tab daily x 1 week, then 1 1/2 tabs daily x 1 week, then 2 tabs daily, Normal           No discharge procedures on file  PDMP Review       Value Time User    PDMP Reviewed  Yes 1/4/2023  2:33 AM Gilberto Leyden, MD           ED Provider  Attending physically available and evaluated Kevin Falls  I managed the patient along with the ED Attending      Electronically Signed by         Radha Johnston,   01/05/23 7007 Greta Patrick, DO  01/05/23 8063

## 2023-01-04 NOTE — ED ATTENDING ATTESTATION
1/4/2023  I, Gilberto Leyden, MD, saw and evaluated the patient  I have discussed the patient with the resident/non-physician practitioner and agree with the resident's/non-physician practitioner's findings, Plan of Care, and MDM as documented in the resident's/non-physician practitioner's note, except where noted  All available labs and Radiology studies were reviewed  I was present for key portions of any procedure(s) performed by the resident/non-physician practitioner and I was immediately available to provide assistance  At this point I agree with the current assessment done in the Emergency Department  I have conducted an independent evaluation of this patient a history and physical is as follows: Patient is a 50year old female with chest tightness and left shoulder pain and palpitations tonight and anxiety  No sob  Was in [facility] last month  No fever  No cough  (+) smokes  No early CAD in family  No N/V  Was last seen in this ED on 6/15/20 for right flank pain  [facility] website checked on this patient and no Rx found  NCAT  Lungs clear  Chest nontender  Heart regular without murmur  Abdomen soft and nontender  Good bowel sounds  No left shoulder tenderness  No rash noted  No edema  Somewhat anxious  No focal deficits  DDX including but not limited to: ACS, MI, PE, PTX, pneumonia; doubt dissection; pleurisy, pericarditis, myocarditis, rhabdomyolysis, GI etiology, thyroid disease, cardiac arrhythmia, thyroid disease, anxiety  Will check EKG, CXR and labs  Will give IV labetalol for elevated blood pressure        ED Course         Critical Care Time  Procedures

## 2023-01-17 ENCOUNTER — OFFICE VISIT (OUTPATIENT)
Dept: FAMILY MEDICINE CLINIC | Facility: CLINIC | Age: 53
End: 2023-01-17

## 2023-01-17 VITALS
BODY MASS INDEX: 30.91 KG/M2 | TEMPERATURE: 97 F | SYSTOLIC BLOOD PRESSURE: 160 MMHG | HEART RATE: 76 BPM | HEIGHT: 62 IN | DIASTOLIC BLOOD PRESSURE: 98 MMHG | OXYGEN SATURATION: 98 % | WEIGHT: 168 LBS

## 2023-01-17 DIAGNOSIS — F10.10 ALCOHOL USE DISORDER, MILD, ABUSE: ICD-10-CM

## 2023-01-17 DIAGNOSIS — K21.9 GASTROESOPHAGEAL REFLUX DISEASE: ICD-10-CM

## 2023-01-17 DIAGNOSIS — R94.31 ABNORMAL EKG: ICD-10-CM

## 2023-01-17 DIAGNOSIS — R00.2 PALPITATIONS: Primary | ICD-10-CM

## 2023-01-17 DIAGNOSIS — R07.9 CHEST PAIN, UNSPECIFIED TYPE: ICD-10-CM

## 2023-01-17 DIAGNOSIS — E03.8 SUBCLINICAL HYPOTHYROIDISM: ICD-10-CM

## 2023-01-17 DIAGNOSIS — F41.1 ANXIETY STATE: ICD-10-CM

## 2023-01-17 RX ORDER — HYDROXYZINE HYDROCHLORIDE 25 MG/1
25 TABLET, FILM COATED ORAL EVERY 6 HOURS PRN
Qty: 30 TABLET | Refills: 0 | Status: SHIPPED | OUTPATIENT
Start: 2023-01-17

## 2023-01-17 RX ORDER — OMEPRAZOLE 20 MG/1
20 CAPSULE, DELAYED RELEASE ORAL DAILY
Qty: 30 CAPSULE | Refills: 5 | Status: SHIPPED | OUTPATIENT
Start: 2023-01-17

## 2023-01-17 RX ORDER — SERTRALINE HYDROCHLORIDE 25 MG/1
TABLET, FILM COATED ORAL
Qty: 60 TABLET | Refills: 3 | Status: SHIPPED | OUTPATIENT
Start: 2023-01-17

## 2023-01-17 NOTE — PATIENT INSTRUCTIONS
Talk to insurance regarding outpt options for rehab  AA (there is fede)   Start zoloft 25mg tabs: 1/2 tab daily x 1 week, then 1 tab daily x 1 week, then 1 1/2 tabs daily x 1 week, then 2 tabs daily  Obtain fasting labs  Obtain holter, stress test, echocardiogram  Flonase 2 sprays in each nostril daily

## 2023-01-17 NOTE — PROGRESS NOTES
Name: Mango Altamirano      : 1970      MRN: [id number]  Encounter Provider: Sherice Melo DO  Encounter Date: 2023   Encounter department: [address] [place] [address]     1  Palpitations  Comments:  ekg reviewed; nml rhythm in ED  check holter  check labs  Orders:  -     Holter monitor; Future; Expected date: 2023  -     Comprehensive metabolic panel; Future    2  Anxiety state  Comments:  discussed need to take medication daily for it to work  start zoloft and uptitrate  r/u 1 mo  Orders:  -     sertraline (ZOLOFT) 25 mg tablet; 1/2 tab daily x 1 week, then 1 tab daily x 1 week, then 1 1/2 tabs daily x 1 week, then 2 tabs daily  -     hydrOXYzine HCL (ATARAX) 25 mg tablet; Take 1 tablet (25 mg total) by mouth every 6 (six) hours as needed for itching or anxiety    3  Abnormal EKG  Comments:  ekg shows potential old septal infarct  given cp, obtain stress test and echo  Orders:  -     Echo complete w/ contrast if indicated; Future; Expected date: 2023  -     NM myocardial perfusion spect (rx stress and/or rest); Future; Expected date: 2023    4  Chest pain, unspecified type  Comments:  check stress test and echo  Orders:  -     Echo complete w/ contrast if indicated; Future; Expected date: 2023  -     NM myocardial perfusion spect (rx stress and/or rest); Future; Expected date: 2023  -     Lipid panel; Future    5  Subclinical hypothyroidism  Comments:  check labs  Orders:  -     TSH, 3rd generation; Future  -     T4, free; Future  -     Anti-microsomal antibody; Future    6  Gastroesophageal reflux disease  -     omeprazole (PriLOSEC) 20 mg delayed release capsule; Take 1 capsule (20 mg total) by mouth daily    7   Alcohol use disorder, mild, abuse  Comments:  discussed coping mechanism of etoh use  pt thinks she may need help with this but doesn't have time off  discussed AA for support  discussed reaching out to insurance co to see about outpt options      BMI Counseling: Body mass index is 30 73 kg/m²  The BMI is above normal  Nutrition recommendations include encouraging healthy choices of fruits and vegetables  Exercise recommendations include exercising 3-5 times per week  Rationale for BMI follow-up plan is due to patient being overweight or obese  Depression Screening and Follow-up Plan: Patient was screened for depression during today's encounter  They screened negative with a PHQ-2 score of 2  Subjective      HPI   Pt presents after ED visit  States she feels anxious all the time and notes some chest pressure when anxiety spikes  feels like heart is racing all the time and pounding  Went to ED on 1/4 for sx  Labs unremarkable  troponins negative   ekg shows old septal infarct  No shortness of breath, n/v, neck/arm/jaw pain  No orthopnea  No LE edema  Pt is only taking zoloft as needed because she feels she doesn't want to always take meds  She does admit she is drinking quite a bit more to combat the anxiety  Monday/tuesday dry  Wed-Sunday drinking 3-4 more or less  Thinks she might need help stopping but can't go to inpt rehab as she has a new job  Review of Systems   Constitutional: Negative for chills, fatigue, fever and unexpected weight change  HENT: Negative for congestion, ear pain, hearing loss, postnasal drip, rhinorrhea, sinus pressure, sinus pain, sore throat, trouble swallowing and voice change  Eyes: Negative for pain, redness and visual disturbance  Respiratory: Negative for chest tightness and shortness of breath  Cardiovascular: Positive for chest pain and palpitations  Negative for leg swelling  Gastrointestinal: Negative for abdominal pain, constipation, diarrhea and nausea  Endocrine: Negative for cold intolerance, heat intolerance, polydipsia and polyuria  Genitourinary: Negative for dysuria, frequency and urgency     Musculoskeletal: Negative for arthralgias, joint swelling and myalgias  Skin: Negative for rash  No suspicious lesions   Neurological: Negative for weakness, numbness and headaches  Hematological: Negative for adenopathy  Psychiatric/Behavioral: Positive for dysphoric mood  Negative for suicidal ideas  The patient is nervous/anxious  Current Outpatient Medications on File Prior to Visit   Medication Sig   • albuterol (PROVENTIL HFA) 90 mcg/act inhaler Inhale 2 puffs every 6 (six) hours as needed for shortness of breath   • cholecalciferol (VITAMIN D3) 1,000 units tablet Take 2,000 Units by mouth daily   • meloxicam (MOBIC) 15 mg tablet Take 1 tablet (15 mg total) by mouth daily   • Multiple Vitamin (MULTIVITAMIN) tablet Take 1 tablet by mouth daily   • [DISCONTINUED] omeprazole (PriLOSEC) 20 mg delayed release capsule Take 1 capsule (20 mg total) by mouth daily   • [DISCONTINUED] sertraline (ZOLOFT) 25 mg tablet 1/2 tab daily x 1 week, then 1 tab daily x 1 week, then 1 1/2 tabs daily x 1 week, then 2 tabs daily   • benzonatate (TESSALON) 200 MG capsule Take 1 capsule (200 mg total) by mouth 3 (three) times a day as needed for cough       Objective     /98 (BP Location: Left arm, Patient Position: Sitting, Cuff Size: Adult)   Pulse 76   Temp (!) 97 °F (36 1 °C)   Ht 5' 2" (1 575 m)   Wt 76 2 kg (168 lb)   LMP 11/07/2016 (Approximate)   SpO2 98%   BMI 30 73 kg/m²     Physical Exam  Constitutional:       General: She is not in acute distress  Appearance: She is well-developed  HENT:      Head: Normocephalic and atraumatic  Right Ear: Tympanic membrane, ear canal and external ear normal       Left Ear: Tympanic membrane, ear canal and external ear normal       Nose: Nose normal       Mouth/Throat:      Pharynx: No oropharyngeal exudate  Eyes:      Conjunctiva/sclera: Conjunctivae normal       Pupils: Pupils are equal, round, and reactive to light  Neck:      Thyroid: No thyromegaly  Vascular: No carotid bruit or JVD  Cardiovascular:      Rate and Rhythm: Regular rhythm  Heart sounds: S1 normal and S2 normal  No murmur heard  No friction rub  No gallop  No S3 or S4 sounds  Pulmonary:      Effort: Pulmonary effort is normal       Breath sounds: Normal breath sounds  No wheezing, rhonchi or rales  Abdominal:      General: Bowel sounds are normal  There is no distension  Palpations: Abdomen is soft  Tenderness: There is no abdominal tenderness  Lymphadenopathy:      Cervical: No cervical adenopathy  Neurological:      Mental Status: She is alert and oriented to person, place, and time  Cranial Nerves: No cranial nerve deficit  Sensory: No sensory deficit  Deep Tendon Reflexes: Reflexes are normal and symmetric  Psychiatric:         Attention and Perception: Attention normal          Mood and Affect: Mood is anxious  Speech: Speech is rapid and pressured  Behavior: Behavior is uncooperative and agitated  Behavior is not aggressive, withdrawn, hyperactive or combative  Thought Content:  Thought content normal          Cognition and Memory: Cognition normal          Judgment: Judgment normal        Inocente Massimo, DO

## 2023-03-11 DIAGNOSIS — F41.1 ANXIETY STATE: ICD-10-CM

## 2023-03-11 DIAGNOSIS — K21.9 GASTROESOPHAGEAL REFLUX DISEASE: ICD-10-CM

## 2023-03-11 RX ORDER — SERTRALINE HYDROCHLORIDE 25 MG/1
TABLET, FILM COATED ORAL
Qty: 180 TABLET | Refills: 2 | Status: SHIPPED | OUTPATIENT
Start: 2023-03-11

## 2023-03-11 RX ORDER — OMEPRAZOLE 20 MG/1
CAPSULE, DELAYED RELEASE ORAL
Qty: 90 CAPSULE | Refills: 2 | Status: SHIPPED | OUTPATIENT
Start: 2023-03-11

## 2023-12-27 ENCOUNTER — HOSPITAL ENCOUNTER (EMERGENCY)
Facility: HOSPITAL | Age: 53
Discharge: HOME | End: 2023-12-27
Attending: STUDENT IN AN ORGANIZED HEALTH CARE EDUCATION/TRAINING PROGRAM

## 2023-12-27 ENCOUNTER — APPOINTMENT (EMERGENCY)
Dept: RADIOLOGY | Facility: HOSPITAL | Age: 53
End: 2023-12-27

## 2023-12-27 VITALS
SYSTOLIC BLOOD PRESSURE: 198 MMHG | RESPIRATION RATE: 18 BRPM | TEMPERATURE: 98 F | DIASTOLIC BLOOD PRESSURE: 88 MMHG | OXYGEN SATURATION: 97 % | HEART RATE: 89 BPM

## 2023-12-27 DIAGNOSIS — J10.1 INFLUENZA B: Primary | ICD-10-CM

## 2023-12-27 DIAGNOSIS — J45.20 MILD INTERMITTENT ASTHMA WITHOUT COMPLICATION: ICD-10-CM

## 2023-12-27 LAB
ALBUMIN SERPL BCP-MCNC: 4.1 G/DL (ref 3.5–5)
ALP SERPL-CCNC: 68 U/L (ref 34–104)
ALT SERPL W P-5'-P-CCNC: 25 U/L (ref 7–52)
ANION GAP SERPL CALCULATED.3IONS-SCNC: 9 MMOL/L
AST SERPL W P-5'-P-CCNC: 36 U/L (ref 13–39)
BASOPHILS # BLD AUTO: 0.03 THOUSANDS/ÂΜL (ref 0–0.1)
BASOPHILS NFR BLD AUTO: 0 % (ref 0–1)
BILIRUB SERPL-MCNC: 0.5 MG/DL (ref 0.2–1)
BUN SERPL-MCNC: 11 MG/DL (ref 5–25)
CALCIUM SERPL-MCNC: 9.1 MG/DL (ref 8.4–10.2)
CARDIAC TROPONIN I PNL SERPL HS: 2 NG/L
CHLORIDE SERPL-SCNC: 103 MMOL/L (ref 96–108)
CO2 SERPL-SCNC: 25 MMOL/L (ref 21–32)
CREAT SERPL-MCNC: 0.64 MG/DL (ref 0.6–1.3)
EOSINOPHIL # BLD AUTO: 0.09 THOUSAND/ÂΜL (ref 0–0.61)
EOSINOPHIL NFR BLD AUTO: 1 % (ref 0–6)
ERYTHROCYTE [DISTWIDTH] IN BLOOD BY AUTOMATED COUNT: 13.9 % (ref 11.6–15.1)
FLUAV RNA RESP QL NAA+PROBE: NEGATIVE
FLUBV RNA RESP QL NAA+PROBE: POSITIVE
GFR SERPL CREATININE-BSD FRML MDRD: 103 ML/MIN/1.73SQ M
GLUCOSE SERPL-MCNC: 106 MG/DL (ref 65–140)
HCT VFR BLD AUTO: 45.4 % (ref 34.8–46.1)
HGB BLD-MCNC: 15.6 G/DL (ref 11.5–15.4)
IMM GRANULOCYTES # BLD AUTO: 0.03 THOUSAND/UL (ref 0–0.2)
IMM GRANULOCYTES NFR BLD AUTO: 0 % (ref 0–2)
LYMPHOCYTES # BLD AUTO: 0.7 THOUSANDS/ÂΜL (ref 0.6–4.47)
LYMPHOCYTES NFR BLD AUTO: 9 % (ref 14–44)
MCH RBC QN AUTO: 34.5 PG (ref 26.8–34.3)
MCHC RBC AUTO-ENTMCNC: 34.4 G/DL (ref 31.4–37.4)
MCV RBC AUTO: 100 FL (ref 82–98)
MONOCYTES # BLD AUTO: 0.8 THOUSAND/ÂΜL (ref 0.17–1.22)
MONOCYTES NFR BLD AUTO: 10 % (ref 4–12)
NEUTROPHILS # BLD AUTO: 6.05 THOUSANDS/ÂΜL (ref 1.85–7.62)
NEUTS SEG NFR BLD AUTO: 80 % (ref 43–75)
NRBC BLD AUTO-RTO: 0 /100 WBCS
PLATELET # BLD AUTO: 267 THOUSANDS/UL (ref 149–390)
PMV BLD AUTO: 9.5 FL (ref 8.9–12.7)
POTASSIUM SERPL-SCNC: 4 MMOL/L (ref 3.5–5.3)
PROT SERPL-MCNC: 7.1 G/DL (ref 6.4–8.4)
RBC # BLD AUTO: 4.52 MILLION/UL (ref 3.81–5.12)
RSV RNA RESP QL NAA+PROBE: NEGATIVE
SARS-COV-2 RNA RESP QL NAA+PROBE: NEGATIVE
SODIUM SERPL-SCNC: 137 MMOL/L (ref 135–147)
WBC # BLD AUTO: 7.7 THOUSAND/UL (ref 4.31–10.16)

## 2023-12-27 PROCEDURE — 80053 COMPREHEN METABOLIC PANEL: CPT | Performed by: STUDENT IN AN ORGANIZED HEALTH CARE EDUCATION/TRAINING PROGRAM

## 2023-12-27 PROCEDURE — 96374 THER/PROPH/DIAG INJ IV PUSH: CPT

## 2023-12-27 PROCEDURE — 99283 EMERGENCY DEPT VISIT LOW MDM: CPT

## 2023-12-27 PROCEDURE — 85025 COMPLETE CBC W/AUTO DIFF WBC: CPT | Performed by: STUDENT IN AN ORGANIZED HEALTH CARE EDUCATION/TRAINING PROGRAM

## 2023-12-27 PROCEDURE — 99285 EMERGENCY DEPT VISIT HI MDM: CPT | Performed by: STUDENT IN AN ORGANIZED HEALTH CARE EDUCATION/TRAINING PROGRAM

## 2023-12-27 PROCEDURE — 84484 ASSAY OF TROPONIN QUANT: CPT | Performed by: STUDENT IN AN ORGANIZED HEALTH CARE EDUCATION/TRAINING PROGRAM

## 2023-12-27 PROCEDURE — 36415 COLL VENOUS BLD VENIPUNCTURE: CPT

## 2023-12-27 PROCEDURE — 71045 X-RAY EXAM CHEST 1 VIEW: CPT

## 2023-12-27 PROCEDURE — 0241U HB NFCT DS VIR RESP RNA 4 TRGT: CPT | Performed by: STUDENT IN AN ORGANIZED HEALTH CARE EDUCATION/TRAINING PROGRAM

## 2023-12-27 RX ORDER — ACETAMINOPHEN 325 MG/1
650 TABLET ORAL ONCE
Status: COMPLETED | OUTPATIENT
Start: 2023-12-27 | End: 2023-12-27

## 2023-12-27 RX ORDER — DEXAMETHASONE 4 MG/1
12 TABLET ORAL ONCE
Status: COMPLETED | OUTPATIENT
Start: 2023-12-27 | End: 2023-12-27

## 2023-12-27 RX ORDER — KETOROLAC TROMETHAMINE 30 MG/ML
15 INJECTION, SOLUTION INTRAMUSCULAR; INTRAVENOUS ONCE
Status: COMPLETED | OUTPATIENT
Start: 2023-12-27 | End: 2023-12-27

## 2023-12-27 RX ORDER — ALBUTEROL SULFATE 90 UG/1
2 AEROSOL, METERED RESPIRATORY (INHALATION) EVERY 6 HOURS PRN
Qty: 6.7 G | Refills: 0 | Status: SHIPPED | OUTPATIENT
Start: 2023-12-27

## 2023-12-27 RX ADMIN — ACETAMINOPHEN 650 MG: 325 TABLET, FILM COATED ORAL at 20:01

## 2023-12-27 RX ADMIN — DEXAMETHASONE 12 MG: 4 TABLET ORAL at 21:51

## 2023-12-27 RX ADMIN — KETOROLAC TROMETHAMINE 15 MG: 30 INJECTION, SOLUTION INTRAMUSCULAR; INTRAVENOUS at 19:59

## 2023-12-27 NOTE — Clinical Note
Ermelinda Cobian was seen and treated in our emergency department on 12/27/2023.                Diagnosis:     Ermelinda  may return to work on return date.    She may return on this date: 12/30/2023         If you have any questions or concerns, please don't hesitate to call.      Jose Alfredo Quiroga MD    ______________________________           _______________          _______________  Hospital Representative                              Date                                Time

## 2023-12-28 NOTE — ED ATTENDING ATTESTATION
12/27/2023  I, Luisito Coker DO, saw and evaluated the patient. I have discussed the patient with the resident/non-physician practitioner and agree with the resident's/non-physician practitioner's findings, Plan of Care, and MDM as documented in the resident's/non-physician practitioner's note, except where noted. All available labs and Radiology studies were reviewed.  I was present for key portions of any procedure(s) performed by the resident/non-physician practitioner and I was immediately available to provide assistance.       At this point I agree with the current assessment done in the Emergency Department.  I have conducted an independent evaluation of this patient a history and physical is as follows:    53-year-old female presents to the ED for evaluation of generalized bodyaches, subjective fever/chills, congestion.  Symptoms started earlier today and have been gradually worsening since onset.  Has had a cough for the past few weeks but this change today prompted ED visit.  Has history of bronchitis but stated that this felt different and so she presented to the ED for evaluation.  On my exam, is resting comfortably no acute distress, normal heart sounds, normal lung sounds, abdomen soft nontender nondistended, moving all extremities with no gross motor deficit.  Labs show no leukocytosis, stable hemoglobin, no acute electrolyte abnormalities, normal renal function, no acute LFT abnormalities, negative troponin (single troponin sufficient given ongoing nature of symptoms greater than 3 hours), positive for influenza B.  EKG is nonischemic as interpreted by myself.  Chest x-ray shows no acute cardiopulmonary pathology as interpreted by myself pending final radiology read.  Patient was given Tylenol, Toradol, as well as oral dexamethasone.  Results discussed.  Will refill the patient's albuterol MDI.  Plan will be for discharge with close outpatient follow-up.  Stable for discharge and agreeable to the plan.   Strict return ED precautions given    ED Course         Critical Care Time  Procedures

## 2023-12-28 NOTE — DISCHARGE INSTRUCTIONS
Today we assessed you for your difficulty breathing and shortness of breath.  Lab work was completed and found you to be positive for influenza B.  You were given Toradol and Tylenol for pain and fever management.  At this time your lab work and x-ray did not signal that you need any more need for ED management.  You are stable for discharge.  You will be given a prescription for a new albuterol inhaler outpatient and you should also follow-up with your primary care provider for continued management of your symptoms outpatient.  Please see the attached instructions for examples of reasons to return to the ER should they exist.  Also see the attached instructions for best management Of your symptoms at home.

## 2023-12-28 NOTE — ED PROVIDER NOTES
History  Chief Complaint   Patient presents with    Asthma     Hx asthma. Sick w cold for 3 weeks. +sob     53-year-old female presenting to the ED with a complaint of general weakness and shortness of breath for the past 3 days.  Patient does have a significant history of asthma.  Patient states that she has been having chills and feeling very warm while at home.  Patient feels that she has been dealing with the symptoms for approximately 3 weeks but the past 3 days the symptoms have significantly worsened.  Patient does note that there are sick contacts at home who are dealing with similar illnesses.  Patient states she feels warm but has not taken her temperature.  Patient is also complaining of headaches, myalgias, general weakness.  Patient is denying lightheadedness, dizziness, nausea, vomiting, diarrhea, blurred vision, chest pain, palpitations, abdominal pain, numbness or tingling the arms and legs.        Prior to Admission Medications   Prescriptions Last Dose Informant Patient Reported? Taking?   Multiple Vitamin (MULTIVITAMIN) tablet   Yes No   Sig: Take 1 tablet by mouth daily   albuterol (PROVENTIL HFA) 90 mcg/act inhaler   No No   Sig: Inhale 2 puffs every 6 (six) hours as needed for shortness of breath   albuterol (Proventil HFA) 90 mcg/act inhaler   No Yes   Sig: Inhale 2 puffs every 6 (six) hours as needed for shortness of breath   benzonatate (TESSALON) 200 MG capsule   No No   Sig: Take 1 capsule (200 mg total) by mouth 3 (three) times a day as needed for cough   cholecalciferol (VITAMIN D3) 1,000 units tablet   Yes No   Sig: Take 2,000 Units by mouth daily   hydrOXYzine HCL (ATARAX) 25 mg tablet   No No   Sig: Take 1 tablet (25 mg total) by mouth every 6 (six) hours as needed for itching or anxiety   meloxicam (MOBIC) 15 mg tablet   No No   Sig: Take 1 tablet (15 mg total) by mouth daily   omeprazole (PriLOSEC) 20 mg delayed release capsule   No No   Sig: TAKE 1 CAPSULE BY MOUTH EVERY DAY  "  sertraline (ZOLOFT) 25 mg tablet   No No   Si/2 TAB DAILY X 1 WEEK, THEN 1 TAB DAILY X 1 WEEK, THEN 1 1/2 TABS DAILY X 1 WEEK, THEN 2 TABS DAILY      Facility-Administered Medications: None       Past Medical History:   Diagnosis Date    Anxiety     Asthma     Heavy menses     Irregular menses     Knee injury     Left knee     Palpitations     PONV (postoperative nausea and vomiting)     Seasonal allergies     Shortness of breath     \"sometimes\"    Trigger finger of right thumb     Wears glasses        Past Surgical History:   Procedure Laterality Date    OTHER SURGICAL HISTORY      Ablation of uterus    MI CONIZATION CERVIX W/WO D&C RPR ELTRD EXC N/A 2016    Procedure: BIOPSY LEEP CERVIX;  Surgeon: C William Riedel, DO;  Location: [facility];  Service: Gynecology    MI HYSTEROSCOPY BX ENDOMETRIUM&/POLYPC W/WO D&C N/A 2016    Procedure: DILATATION AND CURETTAGE (D&C) WITH HYSTEROSCOPY;  Surgeon: C William Riedel, DO;  Location: [facility];  Service: Gynecology    MI HYSTEROSCOPY ENDOMETRIAL ABLATION N/A 2016    Procedure: ABLATION ENDOMETRIAL NOVASURE;  Surgeon: C William Riedel, DO;  Location: [facility];  Service: Gynecology    TUBAL LIGATION         Family History   Problem Relation Age of Onset    Hypertension Mother     Stroke Father     Hypertension Father     No Known Problems Sister     No Known Problems Brother     No Known Problems Son     No Known Problems Daughter     Diabetes Maternal Grandmother     Pneumonia Maternal Grandmother     Cancer Maternal Uncle         Unknown type      I have reviewed and agree with the history as documented.    E-Cigarette/Vaping    E-Cigarette Use Never User      E-Cigarette/Vaping Substances    Nicotine No     THC No     CBD No     Flavoring No      Social History     Tobacco Use    Smoking status: Every Day     Current packs/day: 0.25     Average packs/day: 0.3 packs/day for 11.5 years (2.9 ttl pk-yrs)     Types: Cigarettes     Start date: 2012 " "   Smokeless tobacco: Current    Tobacco comments:     \"would like to quit\"   Vaping Use    Vaping status: Never Used   Substance Use Topics    Alcohol use: Yes     Alcohol/week: 2.0 standard drinks of alcohol     Types: 1 Glasses of wine, 1 Cans of beer per week     Comment: socially     Drug use: No        Review of Systems   Constitutional:  Positive for chills and fatigue.   HENT:  Positive for rhinorrhea. Negative for sinus pressure.    Respiratory:  Positive for cough, chest tightness and shortness of breath.    Cardiovascular:  Negative for chest pain and palpitations.   Gastrointestinal:  Negative for abdominal pain, diarrhea, nausea and vomiting.   Genitourinary:  Negative for difficulty urinating.   Musculoskeletal:  Positive for myalgias. Negative for back pain.   Neurological:  Negative for dizziness.   Psychiatric/Behavioral: Negative.         Physical Exam  ED Triage Vitals   Temperature Pulse Respirations Blood Pressure SpO2   12/27/23 1740 12/27/23 1735 12/27/23 1735 12/27/23 1735 12/27/23 1735   98 °F (36.7 °C) 94 18 (!) 192/112 99 %      Temp Source Heart Rate Source Patient Position - Orthostatic VS BP Location FiO2 (%)   12/27/23 1740 12/27/23 1735 12/27/23 1735 12/27/23 1735 --   Oral Monitor Sitting Right arm       Pain Score       --                    Orthostatic Vital Signs  Vitals:    12/27/23 1735 12/27/23 1936 12/27/23 2000   BP: (!) 192/112 (!) 199/115 (!) 198/88   Pulse: 94 85 89   Patient Position - Orthostatic VS: Sitting         Physical Exam  Constitutional:       Appearance: Normal appearance.   HENT:      Head: Normocephalic and atraumatic.      Right Ear: External ear normal.      Left Ear: External ear normal.      Mouth/Throat:      Pharynx: Oropharynx is clear.   Eyes:      Extraocular Movements: Extraocular movements intact.   Cardiovascular:      Rate and Rhythm: Normal rate and regular rhythm.      Pulses: Normal pulses.      Heart sounds: Normal heart sounds.   Pulmonary: "      Effort: Pulmonary effort is normal.      Breath sounds: Wheezing present.   Abdominal:      General: Bowel sounds are normal.      Palpations: Abdomen is soft.   Musculoskeletal:         General: Normal range of motion.      Cervical back: Normal range of motion.   Skin:     General: Skin is warm.      Capillary Refill: Capillary refill takes less than 2 seconds.   Neurological:      General: No focal deficit present.      Mental Status: She is alert and oriented to person, place, and time.   Psychiatric:         Mood and Affect: Mood normal.         Behavior: Behavior normal.         ED Medications  Medications   acetaminophen (TYLENOL) tablet 650 mg (650 mg Oral Given 12/27/23 2001)   ketorolac (TORADOL) injection 15 mg (15 mg Intravenous Given 12/27/23 1959)   dexamethasone (DECADRON) tablet 12 mg (12 mg Oral Given 12/27/23 2151)       Diagnostic Studies  Results Reviewed       Procedure Component Value Units Date/Time    COVID/FLU/RSV [[id number]]  (Abnormal) Collected: 12/27/23 1741    Lab Status: Final result Specimen: Nares from Nose Updated: 12/27/23 1828     SARS-CoV-2 Negative     INFLUENZA A PCR Negative     INFLUENZA B PCR Positive     RSV PCR Negative    Narrative:      FOR PEDIATRIC PATIENTS - copy/paste COVID Guidelines URL to browser: [email or web address]    SARS-CoV-2 assay is a Nucleic Acid Amplification assay intended for the  qualitative detection of nucleic acid from SARS-CoV-2 in nasopharyngeal  swabs. Results are for the presumptive identification of SARS-CoV-2 RNA.    Positive results are indicative of infection with SARS-CoV-2, the virus  causing COVID-19, but do not rule out bacterial infection or co-infection  with other viruses. Laboratories within the [place] and its  territories are required to report all positive results to the appropriate  public health authorities. Negative results do not preclude SARS-CoV-2  infection  and should not be used as the sole basis for treatment or other  patient management decisions. Negative results must be combined with  clinical observations, patient history, and epidemiological information.  This test has not been FDA cleared or approved.    This test has been authorized by FDA under an Emergency Use Authorization  (EUA). This test is only authorized for the duration of time the  declaration that circumstances exist justifying the authorization of the  emergency use of an in vitro diagnostic tests for detection of SARS-CoV-2  virus and/or diagnosis of COVID-19 infection under section 564(b)(1) of  the Act, 21 U.S.C. 360bbb-3(b)(1), unless the authorization is terminated  or revoked sooner. The test has been validated but independent review by FDA  and CLIA is pending.    Test performed using [employer]: This RT-PCR assay targets N2,  a region unique to SARS-CoV-2. A conserved region in the E-gene was chosen  for pan-Sarbecovirus detection which includes SARS-CoV-2.    According to CMS-2020-01-R, this platform meets the definition of high-throughput technology.    HS Troponin 0hr (reflex protocol) [[id number]]  (Normal) Collected: 12/27/23 1741    Lab Status: Final result Specimen: Blood from Arm, Right Updated: 12/27/23 1810     hs TnI 0hr 2 ng/L     Comprehensive metabolic panel [[id number]] Collected: 12/27/23 1741    Lab Status: Final result Specimen: Blood from Arm, Right Updated: 12/27/23 1803     Sodium 137 mmol/L      Potassium 4.0 mmol/L      Chloride 103 mmol/L      CO2 25 mmol/L      ANION GAP 9 mmol/L      BUN 11 mg/dL      Creatinine 0.64 mg/dL      Glucose 106 mg/dL      Calcium 9.1 mg/dL      AST 36 U/L      ALT 25 U/L      Alkaline Phosphatase 68 U/L      Total Protein 7.1 g/dL      Albumin 4.1 g/dL      Total Bilirubin 0.50 mg/dL      eGFR 103 ml/min/1.73sq m     Narrative:      National Kidney Disease Foundation guidelines for Chronic Kidney Disease (CKD):     Stage 1 with  normal or high GFR (GFR > 90 mL/min/1.73 square meters)    Stage 2 Mild CKD (GFR = 60-89 mL/min/1.73 square meters)    Stage 3A Moderate CKD (GFR = 45-59 mL/min/1.73 square meters)    Stage 3B Moderate CKD (GFR = 30-44 mL/min/1.73 square meters)    Stage 4 Severe CKD (GFR = 15-29 mL/min/1.73 square meters)    Stage 5 End Stage CKD (GFR <15 mL/min/1.73 square meters)  Note: GFR calculation is accurate only with a steady state creatinine    CBC and differential [[id number]]  (Abnormal) Collected: 12/27/23 1741    Lab Status: Final result Specimen: Blood from Arm, Right Updated: 12/27/23 1748     WBC 7.70 Thousand/uL      RBC 4.52 Million/uL      Hemoglobin 15.6 g/dL      Hematocrit 45.4 %       fL      MCH 34.5 pg      MCHC 34.4 g/dL      RDW 13.9 %      MPV 9.5 fL      Platelets 267 Thousands/uL      nRBC 0 /100 WBCs      Neutrophils Relative 80 %      Immat GRANS % 0 %      Lymphocytes Relative 9 %      Monocytes Relative 10 %      Eosinophils Relative 1 %      Basophils Relative 0 %      Neutrophils Absolute 6.05 Thousands/µL      Immature Grans Absolute 0.03 Thousand/uL      Lymphocytes Absolute 0.70 Thousands/µL      Monocytes Absolute 0.80 Thousand/µL      Eosinophils Absolute 0.09 Thousand/µL      Basophils Absolute 0.03 Thousands/µL                    XR chest 1 view portable   ED Interpretation by Jose Alfredo Quiroga MD (12/27 2025)   No acute cardiopulmonary disease.  Unchanged from previous x-ray.            Procedures  Procedures      ED Course  ED Course as of 12/28/23 0120   Thu Dec 28, 2023   0116 INFLU B PCR(!): Positive                                       Medical Decision Making  53-year-old female presenting to the ED with flulike symptoms for 3 days.  First nurse orders were placed prior to provider seeing patient.  Once provider saw patient it was noted that patient is complaining not of chest pain but of generalized weakness and malaise and cough for 3 days.  Patient's lab work was  significant for influenza B.  Patient likely suffering from viral illness at this time.  Given patient's asthma history, Decadron was given in the ED to help with patient's wheezing.  Patient also given Toradol for headaches which she states helped.  Patient also given Tylenol for pain and fever control.  Patient states that she is comfortable with discharge at this time.  Patient given a work note and counseled on symptomatic care for at home.  Patient states she will follow-up with outpatient provider tomorrow.  Patient discharged at this time.    Amount and/or Complexity of Data Reviewed  Labs: ordered.  Radiology: ordered and independent interpretation performed.    Risk  OTC drugs.  Prescription drug management.          Disposition  Final diagnoses:   Influenza B     Time reflects when diagnosis was documented in both MDM as applicable and the Disposition within this note       Time User Action Codes Description Comment    12/27/2023  8:57 PM Jose Alfredo Gordon [J10.1] Influenza B     12/27/2023  8:57 PM Jose Alfredo Gordon [J45.20] Mild intermittent asthma without complication           ED Disposition       ED Disposition   Discharge    Condition   Stable    Date/Time   Wed Dec 27, 2023  9:49 PM    Comment   Ermelinda Cobian discharge to home/self care.                   Follow-up Information       Follow up With Specialties Details Why Contact Info    Yashira Benavidez DO Family Medicine   [address] [facility] [address].  [address]  [place] [address]  [phone]              Discharge Medication List as of 12/27/2023  9:49 PM        CONTINUE these medications which have CHANGED    Details   albuterol (Proventil HFA) 90 mcg/act inhaler Inhale 2 puffs every 6 (six) hours as needed for shortness of breath, Starting Wed 12/27/2023, Normal           CONTINUE these medications which have NOT CHANGED    Details   benzonatate (TESSALON) 200 MG capsule Take 1 capsule (200 mg total) by mouth 3 (three) times a day as needed for  cough, Starting Fri 8/27/2021, Normal      cholecalciferol (VITAMIN D3) 1,000 units tablet Take 2,000 Units by mouth daily, Historical Med      hydrOXYzine HCL (ATARAX) 25 mg tablet Take 1 tablet (25 mg total) by mouth every 6 (six) hours as needed for itching or anxiety, Starting Tue 1/17/2023, Normal      meloxicam (MOBIC) 15 mg tablet Take 1 tablet (15 mg total) by mouth daily, Starting Tue 12/15/2020, Normal      Multiple Vitamin (MULTIVITAMIN) tablet Take 1 tablet by mouth daily, Until Discontinued, Historical Med      omeprazole (PriLOSEC) 20 mg delayed release capsule TAKE 1 CAPSULE BY MOUTH EVERY DAY, Normal      sertraline (ZOLOFT) 25 mg tablet 1/2 TAB DAILY X 1 WEEK, THEN 1 TAB DAILY X 1 WEEK, THEN 1 1/2 TABS DAILY X 1 WEEK, THEN 2 TABS DAILY, Normal           No discharge procedures on file.    PDMP Review         Value Time User    PDMP Reviewed  Yes 1/4/2023  2:33 AM Andrew Avila MD             ED Provider  Attending physically available and evaluated Ermelinda Cobian. I managed the patient along with the ED Attending.    Electronically Signed by           Jose Alfredo Quiroga MD  12/28/23 0123

## 2024-05-30 ENCOUNTER — TELEPHONE (OUTPATIENT)
Age: 54
End: 2024-05-30

## 2024-05-30 NOTE — TELEPHONE ENCOUNTER
Pt called to notify us that she needs her   albuterol (Proventil HFA) 90 mcg/act inhaler and   sertraline (ZOLOFT) 25 mg tablet   (Please see prev MyChart massage).. Pt also stated that when she gets insurance she will make an appt to see PCP because she feels that she needs BP medication. Reports taking her BP occasionally with wrist cuff and its high sometimes. Pt was advise that if at any moment her BP is high and she is experiencing symptoms to seek emergency care despite her insurance situation. Pt agreed.

## 2024-06-03 NOTE — TELEPHONE ENCOUNTER
Form placed in scan folder in clinical area. Please contact patient to see where form should be sent to.